# Patient Record
Sex: MALE | Race: WHITE | NOT HISPANIC OR LATINO | Employment: FULL TIME | URBAN - METROPOLITAN AREA
[De-identification: names, ages, dates, MRNs, and addresses within clinical notes are randomized per-mention and may not be internally consistent; named-entity substitution may affect disease eponyms.]

---

## 2018-05-08 ENCOUNTER — TELEPHONE (OUTPATIENT)
Dept: FAMILY MEDICINE | Facility: CLINIC | Age: 43
End: 2018-05-08

## 2018-05-08 DIAGNOSIS — Z00.00 PREVENTATIVE HEALTH CARE: ICD-10-CM

## 2018-05-08 DIAGNOSIS — E78.5 DYSLIPIDEMIA: ICD-10-CM

## 2018-05-08 DIAGNOSIS — E29.1 HYPOGONADISM IN MALE: Primary | ICD-10-CM

## 2018-05-08 PROBLEM — R73.9 HYPERGLYCEMIA: Status: ACTIVE | Noted: 2018-05-08

## 2018-05-08 PROBLEM — Z87.09 HISTORY OF RESPIRATORY SYSTEM DISEASE: Status: ACTIVE | Noted: 2018-05-08

## 2018-05-08 NOTE — TELEPHONE ENCOUNTER
----- Message from Kathie Bryan sent at 5/8/2018  9:07 AM CDT -----  6m fu   Fax lab orders [place] [phone]    appt 5/15/18. Lm    Let me know when these are done so I can notify patient.  lm

## 2018-05-14 VITALS — WEIGHT: 266 LBS | BODY MASS INDEX: 39.4 KG/M2 | HEIGHT: 69 IN

## 2018-05-15 ENCOUNTER — OFFICE VISIT (OUTPATIENT)
Dept: FAMILY MEDICINE | Facility: CLINIC | Age: 43
End: 2018-05-15
Payer: COMMERCIAL

## 2018-05-15 VITALS
DIASTOLIC BLOOD PRESSURE: 72 MMHG | SYSTOLIC BLOOD PRESSURE: 120 MMHG | OXYGEN SATURATION: 97 % | HEIGHT: 69 IN | BODY MASS INDEX: 37.47 KG/M2 | WEIGHT: 253 LBS | HEART RATE: 68 BPM

## 2018-05-15 DIAGNOSIS — T14.8XXA BLOOD BLISTER: ICD-10-CM

## 2018-05-15 DIAGNOSIS — Z00.00 ANNUAL PHYSICAL EXAM: Primary | ICD-10-CM

## 2018-05-15 PROCEDURE — 99396 PREV VISIT EST AGE 40-64: CPT | Mod: ,,, | Performed by: NURSE PRACTITIONER

## 2018-05-15 NOTE — PATIENT INSTRUCTIONS
Weight Management: Exercise and Activity    Studies show that people who exercise are the most likely to lose weight and keep it off. Exercise burns calories. It helps build muscle to make your body stronger. Make exercise an important part of your weight-management plan.  Make activity part of your day  You may not think you have the time to exercise. But you can work activity into your daily life--you just need to be committed. Take 10 minutes out of your lunch hour to take a walk. Walk to the [facility] to get your paper instead of having it delivered. Make it a habit to take the stairs instead of the elevator. Park in a far away parking spot instead of the closest. Youll be surprised at how fast these little changes can make a difference.  Some people really cannot walk very far, and tire out quickly with exercise. Instead of becoming discouraged, resolve to do what you can do, and work to make that a regular frequent habit.   The benefits of exercise  Exercise offers many benefits including:   · Exercise increases your metabolism (the speed at which your body burns calories).  · Regular exercise can increase the amount of muscle in your body. Muscle burns calories faster than fat. The more muscle you have, the more calories you burn.  · Exercise gives you energy and curbs your appetite.  · Exercise decreases stress and helps you sleep better.  Make exercise fun  Exercise can be fun. Choose an activity you enjoy. You may even get a friend to do it with you:  · Take a resistance-training or aerobics class  · Join a team sport  · Take a dance class  · Walk the dog  · Ride a bike  If you have health problems, be sure to ask your healthcare provider before you start an exercise program. Have a  help you develop a plan thats safe for you.   Date Last Reviewed: 2/4/2016  © 3194-1395 [employer]. [address] [place] [address], [place] [address]. All rights reserved. This information is not  intended as a substitute for professional medical care. Always follow your healthcare professional's instructions.        Eating Heart-Healthy Food: Using the DASH Plan    Eating for your heart doesnt have to be hard or boring. You just need to know how to make healthier choices. The DASH eating plan has been developed to help you do just that. DASH stands for Dietary Approaches to Stop Hypertension. It is a plan that has been proven to be healthier for your heart and to lower your risk for high blood pressure. It can also help lower your risk for cancer, heart disease, osteoporosis, and diabetes.  Choosing from each food group  Choose foods from each of the food groups below each day. Try to get the recommended number of servings for each food group. The serving numbers are based on a diet of 2,000 calories a day. Talk to your doctor if youre unsure about your calorie needs. Along with getting the correct servings, the DASH plan also recommends a sodium intake less than 2,300 mg per day.        Grains  Servings: 6 to 8 a day  A serving is:  · 1 slice bread  · 1 ounce dry cereal  · Half a cup cooked rice, pasta or cereal  Best choices: Whole grains and any grains high in fiber. Vegetables  Servings: 4 to 5 a day  A serving is:  · 1 cup raw leafy vegetable  · Half a cup cut-up raw or cooked vegetable  · Half a cup vegetable juice  Best choices: Fresh or frozen vegetables prepared without added salt or fat.   Fruits  Servings: 4 to 5 a day  A serving is:  · 1 medium fruit  · One-quarter cup dried fruit  · Half a cup fresh, frozen, or canned fruit  · Half a cup of 100% fruit juices  Best choices: A variety of fresh fruits of different colors. Whole fruits are a better choice than fruit juices. Low-fat or fat-free dairy  Servings: 2 to 3 a day  A serving is:  · 1 cup milk  · 1 cup yogurt  · One and a half ounces cheese  Best choices: Skim or 1% milk, low-fat or fat-free yogurt or buttermilk, and low-fat cheeses.          Lean meats, poultry, fish  Servings: 6 or fewer a day  A serving is:  · 1 ounce cooked meats, poultry, or fish  · 1 egg  Best choices: Lean poultry and fish. Trim away visible fat. Broil, grill, roast, or boil instead of frying. Remove skin from poultry before eating. Limit how much red meat you eat.  Nuts, seeds, beans  Servings: 4 to 5 a week  A serving is:  · One-third cup nuts (one and a half ounces)  · 2 tablespoons nut butter or seeds  · Half a cup cooked dry beans or legumes  Best choices: Dry roasted nuts with no salt added, lentils, kidney beans, garbanzo beans, and whole vizcaino beans.   Fats and oils  Servings: 2 to 3 a day  A serving is:  · 1 teaspoon vegetable oil  · 1 teaspoon soft margarine  · 1 tablespoon mayonnaise  · 2 tablespoons salad dressing  Best choices: Nut and vegetable oils (nontropical vegetable oils), such as olive and canola oil. Sweets  Servings: 5 a week or fewer  A serving is:  · 1 tablespoon sugar, maple syrup, or honey  · 1 tablespoon jam or jelly  · 1 half-ounce jelly beans (about 15)  · 1 cup lemonade  Best choices: Dried fruit can be a satisfying sweet. Choose low-fat sweets. And watch your serving sizes!      For more on the DASH eating plan, visit:  www.nhlbi.nih.gov/health/health-topics/topics/dash   Date Last Reviewed: 6/1/2016 © 2000-2017 [employer]. [address] [place] [address]. All rights reserved. This information is not intended as a substitute for professional medical care. Always follow your healthcare professional's instructions.

## 2018-05-15 NOTE — PROGRESS NOTES
"    SUBJECTIVE:      Patient ID: Claude A Stevens is a 43 y.o. male.    Chief Complaint: Annual Exam and Numbness (in feet )    Claude is here today for an annual well exam.  He also reports he has a spot on his right big toe that he is concerned about.  He has been trying to lose weight but is not really exercising but is trying to watch his diet.  He has lost 13 lbs since his last visit with us.    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      Foot Injury    The incident occurred more than 1 week ago. The incident occurred at home. Injury mechanism: rubbing. The pain is present in the right toes. The patient is experiencing no pain. Associated symptoms include a loss of sensation and numbness. Pertinent negatives include no inability to bear weight, loss of motion, muscle weakness or tingling. He reports no foreign bodies present. Nothing aggravates the symptoms. He has tried nothing for the symptoms.       Past Surgical History:   Procedure Laterality Date    VASECTOMY       Family History   Problem Relation Age of Onset    Stroke Father       Social History     Social History    Marital status:      Spouse name: N/A    Number of children: N/A    Years of education: N/A     Occupational History    fork  in freezer      Social History Main Topics    Smoking status: Never Smoker    Smokeless tobacco: Never Used    Alcohol use Yes    Drug use: No    Sexual activity: Not Asked     Other Topics Concern    None     Social History Narrative    None     Current Outpatient Prescriptions   Medication Sig Dispense Refill    krill-om-3-dha-epa-phospho-ast (KRILL OIL) 1,575-915-28-50 mg Cap Take by mouth.      vitamin B complex (B COMPLEX 1 ORAL) Take by mouth.       No current facility-administered medications for this visit.      Review of patient's allergies indicates:  No Known Allergies   Past Medical History:   Diagnosis " "Date    Hyperglycemia     Hyperlipidemia     Hypogonadism in male     Sleep apnea      Past Surgical History:   Procedure Laterality Date    VASECTOMY         Review of Systems   Constitutional: Negative for activity change, appetite change, chills, diaphoresis, fatigue, fever and unexpected weight change.   HENT: Negative for congestion, nosebleeds, rhinorrhea, sore throat and voice change.    Eyes: Negative for pain, discharge and visual disturbance.   Respiratory: Negative for apnea, cough, shortness of breath and wheezing.    Cardiovascular: Negative for chest pain and palpitations.   Gastrointestinal: Negative for abdominal pain, constipation, diarrhea, nausea and vomiting.   Endocrine: Negative for polydipsia, polyphagia and polyuria.   Genitourinary: Negative for difficulty urinating, dysuria, frequency, testicular pain and urgency.   Musculoskeletal: Negative for arthralgias, gait problem and myalgias.   Skin: Negative for color change, pallor and rash.   Allergic/Immunologic: Negative for immunocompromised state.   Neurological: Positive for numbness. Negative for dizziness, tingling, syncope, weakness and headaches.   Hematological: Negative for adenopathy.   Psychiatric/Behavioral: Negative for agitation, confusion, dysphoric mood, self-injury and suicidal ideas. The patient is not nervous/anxious.       OBJECTIVE:      Vitals:    05/15/18 1041   BP: 120/72   Pulse: 68   SpO2: 97%   Weight: 114.8 kg (253 lb)   Height: 5' 9" (1.753 m)     Physical Exam   Constitutional: He is oriented to person, place, and time. He appears well-developed and well-nourished. No distress.   HENT:   Head: Normocephalic and atraumatic.   Right Ear: Tympanic membrane, external ear and ear canal normal.   Left Ear: Tympanic membrane, external ear and ear canal normal.   Nose: Nose normal.   Mouth/Throat: Uvula is midline and oropharynx is clear and moist. No oropharyngeal exudate, posterior oropharyngeal edema or posterior " oropharyngeal erythema.   Eyes: Conjunctivae, EOM and lids are normal. Pupils are equal, round, and reactive to light. Right eye exhibits no discharge. Left eye exhibits no discharge. No scleral icterus.   Neck: Normal range of motion. Neck supple. Carotid bruit is not present. No tracheal deviation present. No thyromegaly present.   Cardiovascular: Normal rate, regular rhythm, normal heart sounds and intact distal pulses.  Exam reveals no gallop and no friction rub.    No murmur heard.  Pulses:       Dorsalis pedis pulses are 2+ on the right side, and 2+ on the left side.        Posterior tibial pulses are 2+ on the right side, and 2+ on the left side.   Pulmonary/Chest: Effort normal and breath sounds normal. No respiratory distress. He has no wheezes. He has no rales.   Abdominal: Soft. Bowel sounds are normal. He exhibits no distension and no mass. There is no hepatosplenomegaly. There is no tenderness. There is no rebound and no guarding.   Musculoskeletal: Normal range of motion. He exhibits no edema or tenderness.        Right foot: There is no deformity.        Left foot: There is no deformity.   Feet:   Right Foot:   Protective Sensation: 10 sites tested. 9 sites sensed.   Skin Integrity: Positive for blister, erythema and dry skin. Negative for ulcer, skin breakdown, warmth or callus.   Left Foot:   Protective Sensation: 10 sites tested. 10 sites sensed.   Skin Integrity: Positive for dry skin. Negative for ulcer, blister, skin breakdown, erythema, warmth or callus.   Lymphadenopathy:     He has no cervical adenopathy.   Neurological: He is alert and oriented to person, place, and time. He displays normal reflexes. Coordination normal.   Skin: Skin is warm, dry and intact. Capillary refill takes less than 2 seconds. Rash noted. Rash is macular (bilateral lower extremities-hyperpgmentation). He is not diaphoretic. No erythema. No pallor.   Psychiatric: He has a normal mood and affect. His behavior is  normal. Judgment and thought content normal. He expresses no suicidal plans.   Vitals reviewed.     Assessment:       1. Annual physical exam    2. Blood blister        Plan:       Annual physical exam   Labs reviewed with patient; healthy lifestyle and recommended well screenings and immunizations reviewed with patient as well.  Pt states he believes he had Tdap about 4 years ago, prior to his son's birth.    Blood blister   Warm soaks with Epsom salt; podiatry referral if does not improve    Vascular Dermatitis   Pt education; weight loss      Follow-up if symptoms worsen or fail to improve.      5/15/2018 Haritha Malave, MARITZA, FNP

## 2019-07-16 ENCOUNTER — TELEPHONE (OUTPATIENT)
Dept: FAMILY MEDICINE | Facility: CLINIC | Age: 44
End: 2019-07-16

## 2019-07-16 DIAGNOSIS — E29.1 HYPOGONADISM IN MALE: ICD-10-CM

## 2019-07-16 DIAGNOSIS — R73.9 HYPERGLYCEMIA: ICD-10-CM

## 2019-07-16 DIAGNOSIS — E78.5 DYSLIPIDEMIA: Primary | ICD-10-CM

## 2019-07-16 DIAGNOSIS — Z00.00 ANNUAL PHYSICAL EXAM: ICD-10-CM

## 2019-07-16 NOTE — TELEPHONE ENCOUNTER
----- Message from Berta Cook sent at 7/16/2019  8:12 AM CDT -----  toro for 7/24, please send lab order to [facility]

## 2019-07-24 ENCOUNTER — OFFICE VISIT (OUTPATIENT)
Dept: FAMILY MEDICINE | Facility: CLINIC | Age: 44
End: 2019-07-24
Payer: COMMERCIAL

## 2019-07-24 ENCOUNTER — TELEPHONE (OUTPATIENT)
Dept: FAMILY MEDICINE | Facility: CLINIC | Age: 44
End: 2019-07-24

## 2019-07-24 VITALS
TEMPERATURE: 99 F | SYSTOLIC BLOOD PRESSURE: 120 MMHG | HEIGHT: 69 IN | DIASTOLIC BLOOD PRESSURE: 78 MMHG | OXYGEN SATURATION: 97 % | WEIGHT: 249 LBS | BODY MASS INDEX: 36.88 KG/M2 | HEART RATE: 70 BPM

## 2019-07-24 DIAGNOSIS — Z23 NEED FOR TDAP VACCINATION: ICD-10-CM

## 2019-07-24 DIAGNOSIS — Z00.00 ANNUAL PHYSICAL EXAM: Primary | ICD-10-CM

## 2019-07-24 PROCEDURE — 99396 PREV VISIT EST AGE 40-64: CPT | Mod: 25,,, | Performed by: NURSE PRACTITIONER

## 2019-07-24 PROCEDURE — 90715 TDAP VACCINE GREATER THAN OR EQUAL TO 7YO IM: ICD-10-PCS | Mod: ,,, | Performed by: NURSE PRACTITIONER

## 2019-07-24 PROCEDURE — 90471 IMMUNIZATION ADMIN: CPT | Mod: ,,, | Performed by: NURSE PRACTITIONER

## 2019-07-24 PROCEDURE — 90471 TDAP VACCINE GREATER THAN OR EQUAL TO 7YO IM: ICD-10-PCS | Mod: ,,, | Performed by: NURSE PRACTITIONER

## 2019-07-24 PROCEDURE — 99396 PR PREVENTIVE VISIT,EST,40-64: ICD-10-PCS | Mod: 25,,, | Performed by: NURSE PRACTITIONER

## 2019-07-24 PROCEDURE — 90715 TDAP VACCINE 7 YRS/> IM: CPT | Mod: ,,, | Performed by: NURSE PRACTITIONER

## 2019-07-24 NOTE — PROGRESS NOTES
SUBJECTIVE:      Patient ID: Claude A Stevens is a 44 y.o. male.    Chief Complaint: Annual Exam    Claude is here today for an annual wellness exam.  He states he has been doing well since his last visit.  He denies new complaints.  Since he was here last, he has started following a healthier diet and he is walking 4 miles a day.  His goal is to start running 5Ks.  Labs reviewed with patient and minor abnormalities discussed.      Past Surgical History:   Procedure Laterality Date    VASECTOMY       Family History   Problem Relation Age of Onset    Stroke Father       Social History     Socioeconomic History    Marital status:      Spouse name: Not on file    Number of children: Not on file    Years of education: Not on file    Highest education level: Not on file   Occupational History    Occupation: fork  in freezer   Social Needs    Financial resource strain: Not on file    Food insecurity:     Worry: Not on file     Inability: Not on file    Transportation needs:     Medical: Not on file     Non-medical: Not on file   Tobacco Use    Smoking status: Never Smoker    Smokeless tobacco: Never Used   Substance and Sexual Activity    Alcohol use: Yes    Drug use: No    Sexual activity: Not on file   Lifestyle    Physical activity:     Days per week: Not on file     Minutes per session: Not on file    Stress: Not on file   Relationships    Social connections:     Talks on phone: Not on file     Gets together: Not on file     Attends Buddhism service: Not on file     Active member of club or organization: Not on file     Attends meetings of clubs or organizations: Not on file     Relationship status: Not on file   Other Topics Concern    Not on file   Social History Narrative    Not on file     No current outpatient medications on file.     No current facility-administered medications for this visit.      Review of patient's allergies indicates:  No Known Allergies   Past  "Medical History:   Diagnosis Date    Hyperglycemia     Hyperlipidemia     Hypogonadism in male     Sleep apnea      Past Surgical History:   Procedure Laterality Date    VASECTOMY         Review of Systems   Constitutional: Negative for activity change, appetite change, chills, diaphoresis, fatigue, fever and unexpected weight change.   HENT: Negative for congestion, nosebleeds, postnasal drip, rhinorrhea, sore throat and voice change.    Eyes: Negative for pain, discharge and visual disturbance.   Respiratory: Negative for apnea, cough, shortness of breath and wheezing.    Cardiovascular: Negative for chest pain, palpitations and leg swelling.   Gastrointestinal: Negative for abdominal pain, constipation, diarrhea, nausea and vomiting.   Endocrine: Negative for polydipsia, polyphagia and polyuria.   Genitourinary: Negative for difficulty urinating, dysuria, frequency, testicular pain and urgency.   Musculoskeletal: Negative for arthralgias, gait problem, myalgias and neck pain.   Skin: Negative for color change, pallor and rash.   Allergic/Immunologic: Negative for immunocompromised state.   Neurological: Negative for dizziness, syncope, weakness, numbness and headaches.   Hematological: Negative for adenopathy. Does not bruise/bleed easily.   Psychiatric/Behavioral: Negative for confusion, dysphoric mood, self-injury, sleep disturbance and suicidal ideas. The patient is not nervous/anxious.       OBJECTIVE:      Vitals:    07/24/19 1535   BP: 120/78   Pulse: 70   Temp: 98.8 °F (37.1 °C)   SpO2: 97%   Weight: 112.9 kg (249 lb)   Height: 5' 9" (1.753 m)     Physical Exam   Constitutional: He is oriented to person, place, and time. He appears well-developed and well-nourished. No distress.   HENT:   Head: Normocephalic and atraumatic.   Right Ear: Tympanic membrane, external ear and ear canal normal.   Left Ear: Tympanic membrane, external ear and ear canal normal.   Nose: Nose normal.   Mouth/Throat: Uvula is " midline and oropharynx is clear and moist. No oropharyngeal exudate, posterior oropharyngeal edema or posterior oropharyngeal erythema.   Eyes: Pupils are equal, round, and reactive to light. Conjunctivae, EOM and lids are normal. Right eye exhibits no discharge. Left eye exhibits no discharge. No scleral icterus.   Neck: Normal range of motion. Neck supple. Carotid bruit is not present. No tracheal deviation present. No thyromegaly present.   Cardiovascular: Normal rate, regular rhythm, normal heart sounds and intact distal pulses. Exam reveals no gallop and no friction rub.   No murmur heard.  Pulmonary/Chest: Effort normal and breath sounds normal. No stridor. No respiratory distress. He has no wheezes. He has no rales.   Abdominal: Soft. Bowel sounds are normal. He exhibits no distension and no mass. There is no hepatosplenomegaly. There is no tenderness. There is no rigidity, no rebound, no guarding and no CVA tenderness.   Musculoskeletal: Normal range of motion. He exhibits no edema.   Lymphadenopathy:     He has no cervical adenopathy.   Neurological: He is alert and oriented to person, place, and time. He displays normal reflexes.   Skin: Skin is warm, dry and intact. Capillary refill takes less than 2 seconds. He is not diaphoretic. No erythema. No pallor.   Psychiatric: He has a normal mood and affect. His behavior is normal. Judgment and thought content normal. He expresses no suicidal plans.      Assessment:       1. Annual physical exam    2. Need for Tdap vaccination        Plan:       Annual physical exam   Healthy lifestyle and recommended well screenings including immunizations reviewed with patient.    Need for Tdap vaccination  -     (In Office Administered) Tdap Vaccine        Follow up in about 1 year (around 7/24/2020), or if symptoms worsen or fail to improve, for annual wellness.      7/24/2019 Haritha Malave, MARITZA, FNP

## 2019-07-24 NOTE — PATIENT INSTRUCTIONS
Prevention Guidelines, Men Ages 40 to 49  Screening tests and vaccines are an important part of managing your health. Health counseling is essential, too. Below are guidelines for these, for men ages 40 to 49. Talk with your healthcare provider to make sure youre up to date on what you need.  Screening Who needs it How often   Alcohol misuse All men in this age group At routine exams   Blood pressure All men in this age group Every 2 years if your blood pressure reading is less than 120/80 mm Hg; yearly if your systolic blood pressure reading is 120 to 139 mm Hg, or your diastolic blood pressure reading is 80 to 89 mm Hg   Depression All men in this age group At routine exams   Type 2 diabetes or prediabetes All adults beginning at age 45 and adults without symptoms at any age who are overweight or obese and have 1 or more other risk factors for diabetes At least every 3 years (yearly if blood sugar has begun to rise)   Hepatitis C Men at increased risk for infection - talk with your healthcare provider At routine exams   High cholesterol or triglycerides All men ages 35 and older, and younger men at high risk for coronary artery disease At least every 5 years   HIV All men At routine exams   Obesity All men in this age group At routine exams   Prostate cancer Starting at age 45, talk to healthcare provider about risks and benefits of digital rectal exam (RYLEE) and prostate-specific antigen (PSA) screening1 At routine exams   Syphilis Men at increased risk for infection - talk with your healthcare provider At routine exams   Tuberculosis Men at increased risk for infection - talk with your healthcare provider Check with your healthcare provider   Vision All men in this age group Every 2 to 4 years if no risk factors for eye disease2   Vaccine Who needs it How often   Chickenpox (varicella) All men in this age group who have no record of this infection or vaccine 2 doses; the second dose should be given at least 4  weeks after the first dose   Hepatitis A Men at increased risk for infection - talk with your healthcare provider 2 doses given at least 6 months apart   Hepatitis B Men at increased risk for infection - talk with your healthcare provider 3 doses over 6 months; second dose should be given 1 month after the first dose; the third dose should be given at least 2 months after the second dose and at least 4 months after the first dose   Haemophilus influenzae Type B (HIB) Men at increased risk for infection - talk with your healthcare provider 1 to 3 doses   Influenza (flu) All men in this age group Once a year   Measles, mumps, rubella (MMR) All men in this age group who have no record of these infections or vaccines 1 or 2 doses   Meningococcal Men at increased risk for infection - talk with your healthcare provider 1 or more doses   Pneumococcal conjugate vaccine (PCV13) and pneumococcal polysaccharide vaccine (PPSV23) Men at increased risk for infection - talk with your healthcare provider PCV13: 1 dose ages 19 to 65 (protects against 13 types of pneumococcal bacteria)     PPSV23: 1 to 2 doses through age 64, or 1 dose at 65 or older (protects against 23 types of pneumococcal bacteria)      Tetanus/diphtheria/  pertussis (Td/Tdap) booster All men in this age group Td every 10 years, or a one-time dose of Tdap instead of a Td booster after age 18, then Td every 10 years   Counseling Who needs it How often   Diet and exercise Men who are overweight or obese When diagnosed, and then at routine exams   Sexually transmitted infection prevention Men at increased risk for infection - talk with your healthcare provider At routine exams   Use of daily aspirin Men ages 45 to 79 at risk for cardiovascular health problems At routine exams   Use of tobacco and the health effects it can cause All men in this age group Every exam   [address] [place] [address] [facility]   [facility] [employer]  Date Last Reviewed:  2/1/2017  © 2345-4750 [employer]. [address] [place] [address]. All rights reserved. This information is not intended as a substitute for professional medical care. Always follow your healthcare professional's instructions.        Aerobic Exercise for a Healthy Heart  Exercise is a lot more than an energy booster and a stress reliever. It also strengthens your heart muscle, lowers your blood pressure and cholesterol, and burns calories. It can also improve your resting muscle tone, and your mood.     Remember, some activity is better than none.    Choose an aerobic activity  Choose an activity that makes your heart and lungs work harder than they do when you rest or walk normally. This aerobic exercise can improve the way your heart and other muscles use oxygen. Make it fun by exercising with a friend and choosing an activity you enjoy. Here are some ideas:  · Walking  · Swimming  · Bicycling  · Stair climbing  · Dancing  · Jogging  · Gardening  Exercise regularly  If you havent been exercising regularly,  get your doctors OK first. Then start slowly.  Here are some tips:  · Begin exercising 3 times a week for 5 to 10 minutes at a time.  · When you feel comfortable, add a few minutes each session.  · Slowly build up to exercising 3 to 4 times each week. Each session should last for 40 minutes, on average, and involve moderate- to vigorous-intensity physical activity.  · If you have been given nitroglycerin, be sure to carry it when you exercise.  · If you get chest pain (angina) when youre exercising, stop what youre doing, take your nitroglycerin, and call your doctor.  Date Last Reviewed: 6/2/2016  © 4442-1770 [employer]. [address] [place] [address]. All rights reserved. This information is not intended as a substitute for professional medical care. Always follow your healthcare professional's instructions.        Eating Heart-Healthy Food: Using the  DASH Plan    Eating for your heart doesnt have to be hard or boring. You just need to know how to make healthier choices. The DASH eating plan has been developed to help you do just that. DASH stands for Dietary Approaches to Stop Hypertension. It is a plan that has been proven to be healthier for your heart and to lower your risk for high blood pressure. It can also help lower your risk for cancer, heart disease, osteoporosis, and diabetes.  Choosing from each food group  Choose foods from each of the food groups below each day. Try to get the recommended number of servings for each food group. The serving numbers are based on a diet of 2,000 calories a day. Talk to your doctor if youre unsure about your calorie needs. Along with getting the correct servings, the DASH plan also recommends a sodium intake less than 2,300 mg per day.        Grains  Servings: 6 to 8 a day  A serving is:  · 1 slice bread  · 1 ounce dry cereal  · Half a cup cooked rice, pasta or cereal  Best choices: Whole grains and any grains high in fiber. Vegetables  Servings: 4 to 5 a day  A serving is:  · 1 cup raw leafy vegetable  · Half a cup cut-up raw or cooked vegetable  · Half a cup vegetable juice  Best choices: Fresh or frozen vegetables prepared without added salt or fat.   Fruits  Servings: 4 to 5 a day  A serving is:  · 1 medium fruit  · One-quarter cup dried fruit  · Half a cup fresh, frozen, or canned fruit  · Half a cup of 100% fruit juices  Best choices: A variety of fresh fruits of different colors. Whole fruits are a better choice than fruit juices. Low-fat or fat-free dairy  Servings: 2 to 3 a day  A serving is:  · 1 cup milk  · 1 cup yogurt  · One and a half ounces cheese  Best choices: Skim or 1% milk, low-fat or fat-free yogurt or buttermilk, and low-fat cheeses.         Lean meats, poultry, fish  Servings: 6 or fewer a day  A serving is:  · 1 ounce cooked meats, poultry, or fish  · 1 egg  Best choices: Lean poultry and  fish. Trim away visible fat. Broil, grill, roast, or boil instead of frying. Remove skin from poultry before eating. Limit how much red meat you eat.  Nuts, seeds, beans  Servings: 4 to 5 a week  A serving is:  · One-third cup nuts (one and a half ounces)  · 2 tablespoons nut butter or seeds  · Half a cup cooked dry beans or legumes  Best choices: Dry roasted nuts with no salt added, lentils, kidney beans, garbanzo beans, and whole vizcaino beans.   Fats and oils  Servings: 2 to 3 a day  A serving is:  · 1 teaspoon vegetable oil  · 1 teaspoon soft margarine  · 1 tablespoon mayonnaise  · 2 tablespoons salad dressing  Best choices: Nut and vegetable oils (nontropical vegetable oils), such as olive and canola oil. Sweets  Servings: 5 a week or fewer  A serving is:  · 1 tablespoon sugar, maple syrup, or honey  · 1 tablespoon jam or jelly  · 1 half-ounce jelly beans (about 15)  · 1 cup lemonade  Best choices: Dried fruit can be a satisfying sweet. Choose low-fat sweets. And watch your serving sizes!      For more on the DASH eating plan, visit:  www.nhlbi.nih.gov/health/health-topics/topics/dash   Date Last Reviewed: 6/1/2016  © 9834-1153 [employer]. [address] [place] [address], [place] [address]. All rights reserved. This information is not intended as a substitute for professional medical care. Always follow your healthcare professional's instructions.

## 2019-07-24 NOTE — TELEPHONE ENCOUNTER
One liver enzyme is slightly elevated; otherwise, labs all look good.  He is due for routine office visit.

## 2020-07-29 ENCOUNTER — TELEPHONE (OUTPATIENT)
Dept: FAMILY MEDICINE | Facility: CLINIC | Age: 45
End: 2020-07-29

## 2020-07-29 DIAGNOSIS — Z00.00 ANNUAL PHYSICAL EXAM: Primary | ICD-10-CM

## 2021-05-07 ENCOUNTER — TELEPHONE (OUTPATIENT)
Dept: FAMILY MEDICINE | Facility: CLINIC | Age: 46
End: 2021-05-07

## 2021-05-13 ENCOUNTER — OFFICE VISIT (OUTPATIENT)
Dept: FAMILY MEDICINE | Facility: CLINIC | Age: 46
End: 2021-05-13
Payer: COMMERCIAL

## 2021-05-13 VITALS
HEIGHT: 69 IN | WEIGHT: 277 LBS | SYSTOLIC BLOOD PRESSURE: 130 MMHG | HEART RATE: 75 BPM | BODY MASS INDEX: 41.03 KG/M2 | DIASTOLIC BLOOD PRESSURE: 78 MMHG | TEMPERATURE: 98 F | OXYGEN SATURATION: 97 %

## 2021-05-13 DIAGNOSIS — E66.01 MORBID OBESITY: ICD-10-CM

## 2021-05-13 DIAGNOSIS — Z00.00 PHYSICAL EXAM: Primary | ICD-10-CM

## 2021-05-13 DIAGNOSIS — G47.33 OSA ON CPAP: ICD-10-CM

## 2021-05-13 DIAGNOSIS — R53.83 FATIGUE, UNSPECIFIED TYPE: ICD-10-CM

## 2021-05-13 PROCEDURE — 3008F PR BODY MASS INDEX (BMI) DOCUMENTED: ICD-10-PCS | Mod: CPTII,S$GLB,, | Performed by: FAMILY MEDICINE

## 2021-05-13 PROCEDURE — 3008F BODY MASS INDEX DOCD: CPT | Mod: CPTII,S$GLB,, | Performed by: FAMILY MEDICINE

## 2021-05-13 PROCEDURE — 99386 PR PREVENTIVE VISIT,NEW,40-64: ICD-10-PCS | Mod: S$GLB,,, | Performed by: FAMILY MEDICINE

## 2021-05-13 PROCEDURE — 1126F PR PAIN SEVERITY QUANTIFIED, NO PAIN PRESENT: ICD-10-PCS | Mod: S$GLB,,, | Performed by: FAMILY MEDICINE

## 2021-05-13 PROCEDURE — 99386 PREV VISIT NEW AGE 40-64: CPT | Mod: S$GLB,,, | Performed by: FAMILY MEDICINE

## 2021-05-13 PROCEDURE — 1126F AMNT PAIN NOTED NONE PRSNT: CPT | Mod: S$GLB,,, | Performed by: FAMILY MEDICINE

## 2021-06-09 LAB — HBA1C MFR BLD: 5.8 % (ref 4–6)

## 2021-06-14 ENCOUNTER — OFFICE VISIT (OUTPATIENT)
Dept: FAMILY MEDICINE | Facility: CLINIC | Age: 46
End: 2021-06-14
Payer: COMMERCIAL

## 2021-06-14 VITALS
DIASTOLIC BLOOD PRESSURE: 74 MMHG | TEMPERATURE: 98 F | WEIGHT: 278 LBS | SYSTOLIC BLOOD PRESSURE: 128 MMHG | HEART RATE: 60 BPM | HEIGHT: 69 IN | OXYGEN SATURATION: 96 % | BODY MASS INDEX: 41.18 KG/M2

## 2021-06-14 DIAGNOSIS — E29.1 HYPOGONADISM IN MALE: Primary | ICD-10-CM

## 2021-06-14 DIAGNOSIS — G47.33 OSA ON CPAP: ICD-10-CM

## 2021-06-14 DIAGNOSIS — E66.01 MORBID OBESITY: ICD-10-CM

## 2021-06-14 PROCEDURE — 3008F PR BODY MASS INDEX (BMI) DOCUMENTED: ICD-10-PCS | Mod: CPTII,S$GLB,, | Performed by: FAMILY MEDICINE

## 2021-06-14 PROCEDURE — 1126F PR PAIN SEVERITY QUANTIFIED, NO PAIN PRESENT: ICD-10-PCS | Mod: S$GLB,,, | Performed by: FAMILY MEDICINE

## 2021-06-14 PROCEDURE — 99213 OFFICE O/P EST LOW 20 MIN: CPT | Mod: S$GLB,,, | Performed by: FAMILY MEDICINE

## 2021-06-14 PROCEDURE — 99213 PR OFFICE/OUTPT VISIT, EST, LEVL III, 20-29 MIN: ICD-10-PCS | Mod: S$GLB,,, | Performed by: FAMILY MEDICINE

## 2021-06-14 PROCEDURE — 3008F BODY MASS INDEX DOCD: CPT | Mod: CPTII,S$GLB,, | Performed by: FAMILY MEDICINE

## 2021-06-14 PROCEDURE — 1126F AMNT PAIN NOTED NONE PRSNT: CPT | Mod: S$GLB,,, | Performed by: FAMILY MEDICINE

## 2021-06-14 RX ORDER — PHENTERMINE HYDROCHLORIDE 37.5 MG/1
37.5 TABLET ORAL
Qty: 30 TABLET | Refills: 0 | Status: SHIPPED | OUTPATIENT
Start: 2021-06-14 | End: 2021-07-14

## 2021-06-16 PROBLEM — E66.01 MORBID OBESITY: Status: ACTIVE | Noted: 2021-06-16

## 2021-06-16 PROBLEM — G47.33 OSA ON CPAP: Status: ACTIVE | Noted: 2021-06-16

## 2021-07-02 ENCOUNTER — PATIENT OUTREACH (OUTPATIENT)
Dept: ADMINISTRATIVE | Facility: HOSPITAL | Age: 46
End: 2021-07-02

## 2021-07-27 ENCOUNTER — OFFICE VISIT (OUTPATIENT)
Dept: FAMILY MEDICINE | Facility: CLINIC | Age: 46
End: 2021-07-27
Payer: COMMERCIAL

## 2021-07-27 VITALS
OXYGEN SATURATION: 97 % | BODY MASS INDEX: 38.06 KG/M2 | WEIGHT: 257 LBS | HEIGHT: 69 IN | TEMPERATURE: 97 F | DIASTOLIC BLOOD PRESSURE: 80 MMHG | HEART RATE: 75 BPM | SYSTOLIC BLOOD PRESSURE: 128 MMHG

## 2021-07-27 DIAGNOSIS — R73.03 PREDIABETES: ICD-10-CM

## 2021-07-27 DIAGNOSIS — E29.1 HYPOGONADISM IN MALE: ICD-10-CM

## 2021-07-27 DIAGNOSIS — Z12.5 SCREENING PSA (PROSTATE SPECIFIC ANTIGEN): ICD-10-CM

## 2021-07-27 DIAGNOSIS — G47.33 OSA ON CPAP: Primary | ICD-10-CM

## 2021-07-27 DIAGNOSIS — E66.01 MORBID OBESITY: ICD-10-CM

## 2021-07-27 PROCEDURE — 3074F PR MOST RECENT SYSTOLIC BLOOD PRESSURE < 130 MM HG: ICD-10-PCS | Mod: CPTII,S$GLB,, | Performed by: FAMILY MEDICINE

## 2021-07-27 PROCEDURE — 3008F BODY MASS INDEX DOCD: CPT | Mod: CPTII,S$GLB,, | Performed by: FAMILY MEDICINE

## 2021-07-27 PROCEDURE — 1159F MED LIST DOCD IN RCRD: CPT | Mod: CPTII,S$GLB,, | Performed by: FAMILY MEDICINE

## 2021-07-27 PROCEDURE — 3008F PR BODY MASS INDEX (BMI) DOCUMENTED: ICD-10-PCS | Mod: CPTII,S$GLB,, | Performed by: FAMILY MEDICINE

## 2021-07-27 PROCEDURE — 3079F PR MOST RECENT DIASTOLIC BLOOD PRESSURE 80-89 MM HG: ICD-10-PCS | Mod: CPTII,S$GLB,, | Performed by: FAMILY MEDICINE

## 2021-07-27 PROCEDURE — 3044F HG A1C LEVEL LT 7.0%: CPT | Mod: CPTII,S$GLB,, | Performed by: FAMILY MEDICINE

## 2021-07-27 PROCEDURE — 1160F RVW MEDS BY RX/DR IN RCRD: CPT | Mod: CPTII,S$GLB,, | Performed by: FAMILY MEDICINE

## 2021-07-27 PROCEDURE — 1159F PR MEDICATION LIST DOCUMENTED IN MEDICAL RECORD: ICD-10-PCS | Mod: CPTII,S$GLB,, | Performed by: FAMILY MEDICINE

## 2021-07-27 PROCEDURE — 1126F PR PAIN SEVERITY QUANTIFIED, NO PAIN PRESENT: ICD-10-PCS | Mod: CPTII,S$GLB,, | Performed by: FAMILY MEDICINE

## 2021-07-27 PROCEDURE — 3074F SYST BP LT 130 MM HG: CPT | Mod: CPTII,S$GLB,, | Performed by: FAMILY MEDICINE

## 2021-07-27 PROCEDURE — 3044F PR MOST RECENT HEMOGLOBIN A1C LEVEL <7.0%: ICD-10-PCS | Mod: CPTII,S$GLB,, | Performed by: FAMILY MEDICINE

## 2021-07-27 PROCEDURE — 99213 OFFICE O/P EST LOW 20 MIN: CPT | Mod: S$GLB,,, | Performed by: FAMILY MEDICINE

## 2021-07-27 PROCEDURE — 99213 PR OFFICE/OUTPT VISIT, EST, LEVL III, 20-29 MIN: ICD-10-PCS | Mod: S$GLB,,, | Performed by: FAMILY MEDICINE

## 2021-07-27 PROCEDURE — 1160F PR REVIEW ALL MEDS BY PRESCRIBER/CLIN PHARMACIST DOCUMENTED: ICD-10-PCS | Mod: CPTII,S$GLB,, | Performed by: FAMILY MEDICINE

## 2021-07-27 PROCEDURE — 3079F DIAST BP 80-89 MM HG: CPT | Mod: CPTII,S$GLB,, | Performed by: FAMILY MEDICINE

## 2021-07-27 PROCEDURE — 1126F AMNT PAIN NOTED NONE PRSNT: CPT | Mod: CPTII,S$GLB,, | Performed by: FAMILY MEDICINE

## 2021-07-27 RX ORDER — CIPROFLOXACIN 500 MG/1
500 TABLET ORAL EVERY 12 HOURS
Qty: 30 TABLET | Refills: 0 | Status: SHIPPED | OUTPATIENT
Start: 2021-07-27 | End: 2022-07-25

## 2021-07-27 RX ORDER — PHENTERMINE HYDROCHLORIDE 37.5 MG/1
37.5 TABLET ORAL
Qty: 30 TABLET | Refills: 0 | Status: SHIPPED | OUTPATIENT
Start: 2021-07-27 | End: 2021-08-26

## 2021-08-01 PROBLEM — R73.03 PREDIABETES: Status: ACTIVE | Noted: 2021-08-01

## 2021-08-01 PROBLEM — R73.9 HYPERGLYCEMIA: Status: RESOLVED | Noted: 2018-05-08 | Resolved: 2021-08-01

## 2022-03-18 ENCOUNTER — PATIENT MESSAGE (OUTPATIENT)
Dept: ADMINISTRATIVE | Facility: HOSPITAL | Age: 47
End: 2022-03-18
Payer: COMMERCIAL

## 2022-06-24 DIAGNOSIS — Z12.11 COLON CANCER SCREENING: ICD-10-CM

## 2022-07-25 ENCOUNTER — OFFICE VISIT (OUTPATIENT)
Dept: URGENT CARE | Facility: CLINIC | Age: 47
End: 2022-07-25
Payer: COMMERCIAL

## 2022-07-25 VITALS
HEART RATE: 79 BPM | DIASTOLIC BLOOD PRESSURE: 90 MMHG | HEIGHT: 69 IN | OXYGEN SATURATION: 98 % | SYSTOLIC BLOOD PRESSURE: 136 MMHG | WEIGHT: 280 LBS | BODY MASS INDEX: 41.47 KG/M2 | TEMPERATURE: 99 F | RESPIRATION RATE: 18 BRPM

## 2022-07-25 DIAGNOSIS — H44.001 EYE INFECTION, RIGHT: Primary | ICD-10-CM

## 2022-07-25 PROCEDURE — 3075F SYST BP GE 130 - 139MM HG: CPT | Mod: CPTII,S$GLB,, | Performed by: NURSE PRACTITIONER

## 2022-07-25 PROCEDURE — 1159F PR MEDICATION LIST DOCUMENTED IN MEDICAL RECORD: ICD-10-PCS | Mod: CPTII,S$GLB,, | Performed by: NURSE PRACTITIONER

## 2022-07-25 PROCEDURE — 1160F RVW MEDS BY RX/DR IN RCRD: CPT | Mod: CPTII,S$GLB,, | Performed by: NURSE PRACTITIONER

## 2022-07-25 PROCEDURE — 3008F BODY MASS INDEX DOCD: CPT | Mod: CPTII,S$GLB,, | Performed by: NURSE PRACTITIONER

## 2022-07-25 PROCEDURE — 3080F DIAST BP >= 90 MM HG: CPT | Mod: CPTII,S$GLB,, | Performed by: NURSE PRACTITIONER

## 2022-07-25 PROCEDURE — 99203 OFFICE O/P NEW LOW 30 MIN: CPT | Mod: S$GLB,,, | Performed by: NURSE PRACTITIONER

## 2022-07-25 PROCEDURE — 1159F MED LIST DOCD IN RCRD: CPT | Mod: CPTII,S$GLB,, | Performed by: NURSE PRACTITIONER

## 2022-07-25 PROCEDURE — 3080F PR MOST RECENT DIASTOLIC BLOOD PRESSURE >= 90 MM HG: ICD-10-PCS | Mod: CPTII,S$GLB,, | Performed by: NURSE PRACTITIONER

## 2022-07-25 PROCEDURE — 99203 PR OFFICE/OUTPT VISIT, NEW, LEVL III, 30-44 MIN: ICD-10-PCS | Mod: S$GLB,,, | Performed by: NURSE PRACTITIONER

## 2022-07-25 PROCEDURE — 3075F PR MOST RECENT SYSTOLIC BLOOD PRESS GE 130-139MM HG: ICD-10-PCS | Mod: CPTII,S$GLB,, | Performed by: NURSE PRACTITIONER

## 2022-07-25 PROCEDURE — 3008F PR BODY MASS INDEX (BMI) DOCUMENTED: ICD-10-PCS | Mod: CPTII,S$GLB,, | Performed by: NURSE PRACTITIONER

## 2022-07-25 PROCEDURE — 1160F PR REVIEW ALL MEDS BY PRESCRIBER/CLIN PHARMACIST DOCUMENTED: ICD-10-PCS | Mod: CPTII,S$GLB,, | Performed by: NURSE PRACTITIONER

## 2022-07-25 NOTE — PROGRESS NOTES
"Subjective:       Patient ID: Claude A Stevens is a 47 y.o. male.    Vitals:  height is 5' 9" (1.753 m) and weight is 127 kg (280 lb). His temperature is 98.9 °F (37.2 °C). His blood pressure is 136/90 (abnormal) and his pulse is 79. His respiration is 18 and oxygen saturation is 98%.     Chief Complaint: Eye Injury and Dental Pain    Patient attributes to wearing contacts he has had similar    Eye Injury   The right eye is affected. This is a new problem. The current episode started in the past 7 days (x's 2 days ). The problem occurs constantly. The problem has been unchanged. The injury mechanism was contact lenses. The pain is at a severity of 4/10. The pain is mild. There is no known exposure to pink eye. He wears contacts. Associated symptoms include blurred vision, an eye discharge, eye redness and photophobia. Pertinent negatives include no fever (feeling feverish). He has tried nothing (erythromicin ) for the symptoms. The treatment provided no relief.       Constitution: Positive for chills. Negative for fever (feeling feverish).   HENT: Positive for facial swelling (Right-sided facial pain.  Patient uncertain if due to current I symptoms or if it is a tooth ache). Negative for facial trauma.    Eyes: Positive for eye discharge, eye pain, eye redness, photophobia and blurred vision. Negative for eye trauma and foreign body in eye.       Objective:      Physical Exam   Constitutional: He is oriented to person, place, and time. He appears well-developed.  Non-toxic appearance. He appears ill. No distress.   HENT:   Head: Normocephalic and atraumatic.   Nose: Nose normal. No rhinorrhea or congestion.   Mouth/Throat: Oropharynx is clear and moist.   Eyes: Pupils are equal, round, and reactive to light. Right eye exhibits chemosis and discharge. Right eye exhibits no exudate and no hordeolum. Right conjunctiva is injected. Right conjunctiva has a hemorrhage. Right eye exhibits abnormal extraocular motion " (Painful). Right pupil is sluggish. Right pupil is round. vision grossly intact      Comments: Significant photophobia.     Neck: Neck supple. No neck rigidity present.   Cardiovascular: Normal rate, regular rhythm and normal heart sounds.   Pulmonary/Chest: Effort normal. No respiratory distress.   Abdominal: Normal appearance.   Musculoskeletal:      Cervical back: He exhibits no tenderness.   Lymphadenopathy:     He has no cervical adenopathy.   Neurological: no focal deficit. He is alert and oriented to person, place, and time.   Skin: Skin is warm, dry and not diaphoretic. Capillary refill takes 2 to 3 seconds.   Psychiatric: His behavior is normal. Mood normal.   Nursing note and vitals reviewed.        Assessment:       1. Eye infection, right          Plan:         Eye infection, right    To ER for further evaluation of infection right eye be on simple conjunctivitis with globe pain, pain with extraocular movement, severe photophobia, chemosis and hemorrhage     Concern for orbital cellulitis.  Patient informed of the emergent status of requiring immediate treatment and if not treated appropriately immediately unknown long-term affect which could include loss of vision or eye or systemic infection.  Verbalizes understanding states that he is going to go to [facility]

## 2022-07-28 DIAGNOSIS — Z12.11 COLON CANCER SCREENING: ICD-10-CM

## 2022-08-29 ENCOUNTER — TELEPHONE (OUTPATIENT)
Dept: FAMILY MEDICINE | Facility: CLINIC | Age: 47
End: 2022-08-29
Payer: COMMERCIAL

## 2022-08-29 NOTE — TELEPHONE ENCOUNTER
----- Message from Higinio Thompson MA sent at 8/29/2022  9:18 AM CDT -----  Contact: STEVENS, CLAUDE A [[id number]]  Type: Needs Medical Advice    Who Called: STEVENS, CLAUDE A [[id number]]  Best Call Back Number: [phone]  Inquiry/Question: Please call STEVENS, CLAUDE A [[id number]] regarding a covid test      Thank you~

## 2022-08-29 NOTE — TELEPHONE ENCOUNTER
Pt wants a covid test script for his pharm so he can use it on his wife and kids. He has already taken a test and tested positive (home test) jasper RAHMAN advised instructions for covid positive. For the test he wants a script for to use on his family dr lunsford stated he cant because he has never assessed or established his family as his patient so it would be his responsibility if something were to happen to the family if he was to order the test. Patient was not happy with that answer and stated its not rocket science and its not like he is asking for medication and hung the phone up in my face. Also before he hung up I advised him he hasnt been seen in a year

## 2023-01-10 ENCOUNTER — TELEPHONE (OUTPATIENT)
Dept: FAMILY MEDICINE | Facility: CLINIC | Age: 48
End: 2023-01-10
Payer: COMMERCIAL

## 2023-01-10 DIAGNOSIS — R53.83 FATIGUE, UNSPECIFIED TYPE: ICD-10-CM

## 2023-01-10 DIAGNOSIS — E78.5 DYSLIPIDEMIA: ICD-10-CM

## 2023-01-10 DIAGNOSIS — R73.03 PREDIABETES: Primary | ICD-10-CM

## 2023-01-10 DIAGNOSIS — Z00.00 ANNUAL PHYSICAL EXAM: ICD-10-CM

## 2023-01-10 NOTE — TELEPHONE ENCOUNTER
----- Message from Curtis Sheppard sent at 1/10/2023 12:28 PM CST -----  Contact: pt  Type: Needs Medical Advice  Who Called:  pt     Best Call Back Number: [phone]    Additional Information: pt would like orders for blood test put in and sent to sent to [facility] before appt fax number [phone]. Please advise.

## 2023-01-10 NOTE — TELEPHONE ENCOUNTER
"Subjective:       Patient ID: Ryan Bernal is a 4 y.o. male.    Vitals:  height is 4' 0.5" (1.232 m) and weight is 26.6 kg (58 lb 12 oz). His temperature is 98.3 °F (36.8 °C). His pulse is 98. His respiration is 16 and oxygen saturation is 98%.     Chief Complaint: COVID-19 Concerns (Entered by patient)    Patient is with sibling and parent on exam. Patient presents to urgent care with cough and congestion. Patient reports positive exposure to COVID-19 at home.    Other  This is a new problem. The current episode started in the past 7 days. The problem occurs intermittently. The problem has been waxing and waning. Associated symptoms include congestion and coughing. Pertinent negatives include no abdominal pain, anorexia, arthralgias, change in bowel habit, chest pain, chills, diaphoresis, fatigue, fever, headaches, joint swelling, myalgias, nausea, neck pain, numbness, rash, sore throat, swollen glands, urinary symptoms, visual change, vomiting or weakness. Nothing aggravates the symptoms. He has tried nothing for the symptoms.       Constitution: Negative for chills, sweating, fatigue and fever.   HENT: Positive for congestion, postnasal drip and sinus pressure. Negative for ear pain, drooling, nosebleeds, foreign body in nose, sinus pain, sore throat, trouble swallowing and voice change.    Neck: Negative for neck pain, neck stiffness, painful lymph nodes and neck swelling.   Cardiovascular: Negative for chest pain, leg swelling, palpitations, sob on exertion and passing out.   Eyes: Negative for eye discharge, eye itching, eye pain, eye redness and eyelid swelling.   Respiratory: Positive for cough. Negative for chest tightness, sputum production, bloody sputum, shortness of breath, stridor and wheezing.    Gastrointestinal: Negative for abdominal pain, abdominal bloating, nausea, vomiting, constipation, diarrhea and heartburn.   Genitourinary: Negative for urine decreased.   Musculoskeletal: Negative for joint " ----- Message from Curtis Sheppard sent at 1/10/2023 12:28 PM CST -----  Contact: pt  Type: Needs Medical Advice  Who Called:  pt     Best Call Back Number: [phone]    Additional Information: pt would like orders for blood test put in and sent to sent to [facility] before appt fax number [phone]. Please advise.           pain, joint swelling, abnormal ROM of joint, pain with walking, muscle cramps and muscle ache.   Skin: Negative for rash and hives.   Allergic/Immunologic: Negative for hives, itching and sneezing.   Neurological: Negative for dizziness, light-headedness, passing out, facial drooping, speech difficulty, loss of balance, headaches, altered mental status, loss of consciousness, numbness and seizures.   Hematologic/Lymphatic: Negative for swollen lymph nodes.   Psychiatric/Behavioral: Negative for altered mental status and nervous/anxious. The patient is not nervous/anxious.        Objective:      Physical Exam   Constitutional: He appears well-developed. He is active and cooperative.  Non-toxic appearance. He does not appear ill. No distress.   HENT:   Head: Normocephalic and atraumatic. No signs of injury. There is normal jaw occlusion.   Ears:   Right Ear: Tympanic membrane, external ear and ear canal normal.   Left Ear: Tympanic membrane, external ear and ear canal normal.   Nose: Mucosal edema, rhinorrhea and congestion present. No signs of injury. No epistaxis in the right nostril. No epistaxis in the left nostril.   Mouth/Throat: Mucous membranes are moist. No posterior oropharyngeal erythema, pharynx swelling or pharynx petechiae. No tonsillar exudate. Oropharynx is clear.   Eyes: Conjunctivae and lids are normal. Visual tracking is normal. Right eye exhibits no discharge and no exudate. Left eye exhibits no discharge and no exudate. No scleral icterus.   Neck: Trachea normal. Neck supple. No neck rigidity present.   Cardiovascular: Normal rate and regular rhythm. Pulses are strong.   Pulmonary/Chest: Effort normal and breath sounds normal. No accessory muscle usage, nasal flaring or stridor. No respiratory distress. He has no decreased breath sounds. He has no wheezes. He has no rhonchi. He has no rales. He exhibits no retraction.   Abdominal: Bowel sounds are normal. He exhibits no distension. Soft. There is  no abdominal tenderness.   Musculoskeletal: Normal range of motion.         General: No tenderness, deformity or signs of injury. Normal range of motion.   Lymphadenopathy:     He has no cervical adenopathy.   Neurological: He is alert.   Skin: Skin is warm, dry, not diaphoretic and no rash. Capillary refill takes less than 2 seconds. No abrasion, No burn and No bruising   Psychiatric: His speech is normal and behavior is normal.   Nursing note and vitals reviewed.    Results for orders placed or performed in visit on 01/23/22   POCT COVID-19 Rapid Screening   Result Value Ref Range    POC Rapid COVID Negative Negative     Acceptable Yes            Assessment:       1. Exposure to COVID-19 virus    2. Sinus congestion          Plan:         Exposure to COVID-19 virus  -     POCT COVID-19 Rapid Screening    Sinus congestion  -     POCT COVID-19 Rapid Screening      Patient Instructions   You must understand that you've received an Urgent Care treatment only and that you may be released before all your medical problems are known or treated. You, the patient, will arrange for follow up care as instructed.  Follow up with your PCP or specialty clinic as directed if not improved or as needed. You can call [phone] to schedule an appointment with the appropriate provider.  If your condition worsens we recommend that you receive another evaluation at the Emergency Department for any concerns or worsening of condition.  Patient/parent aware and verbalized understanding.    Reviewed COVID-19 results with patient/parent.  Counseled patient/parent and answered questions in regards to COVID-19 testing.   Advised patient to go home, treat symptoms and avoid contact with others at this time.  Increase fluids and rest is important.  Humidifier use at home.  OTC Children's Claritin or Zyrtec daily as needed for nasal congestion/postnasal drip/allergies.  OTC Children's Flonase Nasal [place] as directed for nasal  congestion/postnasal drip/allergies.  Advised patient to take OTC Children's VITAMIN C as discussed.  Alternate OTC Children's Tylenol and Motrin unless contraindicated every 4-6 hours as needed for pain, headache, fever, etc.  Info given for virtual visit, covid 19 information line, state info line.   Advised patient/parent to follow-up with Pediatrician and/or Specialist for further evaluation as needed.   Strict ER precautions given to patient.  Follow local/state guidelines per covid emergency.   Patient/parent aware, verbalized understanding and agreed with plan of care.    CDC RECOMMENDATIONS  --IF test results are NEGATIVE and NO known high risk exposure to covid-19 virus, you can be excluded from work/school until:  o MINIMUM OF 24 hours fever-free without the use of fever-reducing medications AND  o Improvement in symptoms (e.g. cough, shortness of breath, fatigue, GI symptoms, etc)     --IF YOU ARE BEING TESTED BECAUSE OF A HIGH RISK EXPOSURE, which the CDC defines as direct contact 6 feet or less for >15 minutes with a known positive person, you should follow CDC guidelines as well as your employer/school protocols for safely returning to work/school.   *Please be aware that there are False Negative possibilities with testing, so you should return to work/school based upon CDC guidelines, not simply a negative result, unless your employer/school has a different RTW protocol/guidance for you.     --IF test results are POSITIVE , you should be excluded from work/school until:  o At least 24 hours FEVER-FREE without the use of fever-reducing medications AND  o Improvement in symptoms (e.g., cough, shortness of breathing, fatigue, GI symptoms, etc) AND  o At least 5 days have passed since symptoms first appeared.    IF NOT IMPROVING, FOLLOW UP WITH VIRTUAL ONLINE VISIT WITH A PROVIDER 24/7 - FOR MORE INFORMATION OR TO DOWNLOAD THE J[facility], VISIT [facility] ANYWHERE CARE AT [facility]/ANYWHERE  FOR 24/7 NURSE  ADVICE, CALL [phone]  FOR COVID 19 RELATED QUESTIONS, CALL the [facility] covid hotline: [phone]  [place] FOR UP TO DATE INFORMATION: Text or dial 211, test keyword [place] TO [id number] OR DIAL 211    HELPFUL EXTERNAL RESOURCES:  OFFICE OF PUBLIC HEALTH: [place] - http://ldh.la.gov/ and 1-659.350.8976  CENTER FOR DISEASE CONTROL - https://www.cdc.gov/   WORLD HEALTH ORGANIZATION (WHO) - https://www.who.int/   CDC WHEN TO QUARANTINE - https://www.cdc.gov/coronavirus/2019-ncov/if-you-are-sick/quarantine.html     INFO ABOUT [facility] COVID-19 RAPID TESTING:  This test utilizes isothermal nucleic acid amplification technology to detect the SARS-CoV-2 RdRp nucleic acid segment.   The analytical sensitivity (limit of detection) is 125 genome equivalents/mL.   A POSITIVE result implies infection with the SARS-CoV-2 virus; the patient is presumed to be contagious.     A NEGATIVE result means that SARS-CoV-2 nucleic acids are not present above the limit of detection.   A NEGATIVE result should be treated as presumptive. It does not rule out the possibility of COVID-19 and should not be the sole basis for treatment decisions.   This test is only for use under the Food and Drug Administration s Emergency Use Authorization (EUA).   Commercial kits are provided by [employer]. Performance characteristics of the EUA have been independently verified by [facility] Department of Pathology and Laboratory Medicine.   _________________________________________________________________   The authorized Fact Sheet for Healthcare Providers and the authorized Fact Sheet for Patients of the ID NOW COVID-19 are available on the FDA website:   https://www.fda.gov/media/451748/download  https://www.fda.gov/media/846270/download

## 2023-01-23 ENCOUNTER — OFFICE VISIT (OUTPATIENT)
Dept: FAMILY MEDICINE | Facility: CLINIC | Age: 48
End: 2023-01-23
Payer: COMMERCIAL

## 2023-01-23 VITALS
TEMPERATURE: 98 F | HEART RATE: 65 BPM | WEIGHT: 284.13 LBS | SYSTOLIC BLOOD PRESSURE: 124 MMHG | DIASTOLIC BLOOD PRESSURE: 78 MMHG | BODY MASS INDEX: 42.08 KG/M2 | RESPIRATION RATE: 18 BRPM | HEIGHT: 69 IN | OXYGEN SATURATION: 97 %

## 2023-01-23 DIAGNOSIS — G47.33 OSA ON CPAP: ICD-10-CM

## 2023-01-23 DIAGNOSIS — Z13.220 SCREENING FOR HYPERLIPIDEMIA: ICD-10-CM

## 2023-01-23 DIAGNOSIS — E78.5 DYSLIPIDEMIA: ICD-10-CM

## 2023-01-23 DIAGNOSIS — Z00.00 ANNUAL PHYSICAL EXAM: Primary | ICD-10-CM

## 2023-01-23 DIAGNOSIS — Z13.1 SCREENING FOR DIABETES MELLITUS: ICD-10-CM

## 2023-01-23 DIAGNOSIS — E29.1 HYPOGONADISM IN MALE: ICD-10-CM

## 2023-01-23 DIAGNOSIS — R53.83 FATIGUE, UNSPECIFIED TYPE: ICD-10-CM

## 2023-01-23 DIAGNOSIS — R73.03 PREDIABETES: ICD-10-CM

## 2023-01-23 DIAGNOSIS — E66.01 MORBID OBESITY: ICD-10-CM

## 2023-01-23 PROCEDURE — 99396 PR PREVENTIVE VISIT,EST,40-64: ICD-10-PCS | Mod: S$GLB,,,

## 2023-01-23 PROCEDURE — 3078F DIAST BP <80 MM HG: CPT | Mod: CPTII,S$GLB,,

## 2023-01-23 PROCEDURE — 3074F PR MOST RECENT SYSTOLIC BLOOD PRESSURE < 130 MM HG: ICD-10-PCS | Mod: CPTII,S$GLB,,

## 2023-01-23 PROCEDURE — 99396 PREV VISIT EST AGE 40-64: CPT | Mod: S$GLB,,,

## 2023-01-23 PROCEDURE — 3008F PR BODY MASS INDEX (BMI) DOCUMENTED: ICD-10-PCS | Mod: CPTII,S$GLB,,

## 2023-01-23 PROCEDURE — 3078F PR MOST RECENT DIASTOLIC BLOOD PRESSURE < 80 MM HG: ICD-10-PCS | Mod: CPTII,S$GLB,,

## 2023-01-23 PROCEDURE — 3074F SYST BP LT 130 MM HG: CPT | Mod: CPTII,S$GLB,,

## 2023-01-23 PROCEDURE — 3008F BODY MASS INDEX DOCD: CPT | Mod: CPTII,S$GLB,,

## 2023-01-23 NOTE — PROGRESS NOTES
Subjective:       Patient ID: Claude A Stevens is a 48 y.o. male.    Chief Complaint: Annual Exam    Patient presents to clinic for check up and to discuss weight loss.  History of Hyperlipidemia. Hypogonadism, prediabetes, and obesity. BMI today is 41.95.  He has had his labs up till now at [facility].  We have no access to his results.  He is not currently taking any medications.  For weight loss he states he just feels like eating all the time. He is also feeling very fatigued.   He has also not been working out like he used to. He has tried adipex and Contrave in the past and did have some positive results at that time. He is also complaining of pain in both feet.  He states he has plantar fascitis. He is wanting to see a Podiatrist.      Review of patient's allergies indicates:  No Known Allergies  Social Determinants of Health     Tobacco Use: Medium Risk    Smoking Tobacco Use: Former    Smokeless Tobacco Use: Never    Passive Exposure: Not on file   Alcohol Use: Not on file   Financial Resource Strain: Not on file   Food Insecurity: Not on file   Transportation Needs: Not on file   Physical Activity: Not on file   Stress: Not on file   Social Connections: Not on file   Housing Stability: Not on file   Depression: Low Risk     Last PHQ Score: 0      Past Medical History:   Diagnosis Date    Hyperglycemia     Hyperlipidemia     Hypogonadism in male     Sleep apnea       Past Surgical History:   Procedure Laterality Date    VASECTOMY        Social History     Socioeconomic History    Marital status:     Number of children: 4       No current outpatient medications on file.    Lab Results   Component Value Date    HGBA1C 5.8 06/09/2021       Review of Systems   Constitutional:  Positive for fatigue.   Respiratory: Negative.     Cardiovascular: Negative.    Musculoskeletal:         Bilateral foot pain     Neurological: Negative.    Psychiatric/Behavioral: Negative.       Objective:      Physical  Exam  Constitutional:       Appearance: Normal appearance.   HENT:      Head: Normocephalic and atraumatic.      Right Ear: Tympanic membrane normal.      Left Ear: Tympanic membrane normal.      Nose: Nose normal.      Mouth/Throat:      Mouth: Mucous membranes are moist.   Eyes:      Pupils: Pupils are equal, round, and reactive to light.   Neck:      Vascular: No carotid bruit.   Cardiovascular:      Rate and Rhythm: Normal rate and regular rhythm.      Pulses: Normal pulses.           Radial pulses are 2+ on the right side and 2+ on the left side.        Dorsalis pedis pulses are 2+ on the right side and 2+ on the left side.      Heart sounds: Normal heart sounds, S1 normal and S2 normal.   Pulmonary:      Effort: Pulmonary effort is normal.      Breath sounds: Normal breath sounds.   Abdominal:      General: Abdomen is flat. Bowel sounds are normal.      Palpations: Abdomen is soft.      Tenderness: There is no abdominal tenderness.   Musculoskeletal:         General: Normal range of motion.      Cervical back: Normal range of motion and neck supple.      Right lower leg: No edema.      Left lower leg: No edema.      Right foot: Normal range of motion.      Left foot: Normal range of motion.   Feet:      Right foot:      Skin integrity: Erythema present.      Left foot:      Skin integrity: Erythema present.      Comments: Feet at not tender to palpation including heels   Lymphadenopathy:      Cervical: No cervical adenopathy.   Skin:     General: Skin is warm and dry.      Capillary Refill: Capillary refill takes less than 2 seconds.   Neurological:      Mental Status: He is alert and oriented to person, place, and time.   Psychiatric:         Mood and Affect: Mood normal.         Behavior: Behavior normal.         Thought Content: Thought content normal.         Judgment: Judgment normal.       Assessment:       1. Annual physical exam    2. Fatigue, unspecified type    3. Screening for diabetes mellitus     4. Screening for hyperlipidemia          Plan:       Claude was seen today for annual exam.    Diagnoses and all orders for this visit:    Annual physical exam  -     CBC Auto Differential; Future  -     Comprehensive Metabolic Panel; Future  -     Hepatitis C Antibody; Future  -     HIV 1/2 Ag/Ab (4th Gen); Future    Fatigue, unspecified type  -     TSH; Future  -     Testosterone; Future    Screening for diabetes mellitus  -     Hemoglobin A1C; Future    Screening for hyperlipidemia  -     Lipid Panel; Future     Fasting labs reordered to have at [facility].    Will refer to Podiatry at patient request.  He will let the office know which doctor he would like to see.    Discussed GLP1s for weight loss but do to cost patient would prefer another medication. Will refer to Dr. Mcneal for weight loss medication.  Patient declines vaccines today  Routine follow up in 6 months.

## 2023-02-06 ENCOUNTER — LAB VISIT (OUTPATIENT)
Dept: LAB | Facility: HOSPITAL | Age: 48
End: 2023-02-06
Payer: COMMERCIAL

## 2023-02-06 DIAGNOSIS — Z13.220 SCREENING FOR HYPERLIPIDEMIA: ICD-10-CM

## 2023-02-06 DIAGNOSIS — Z00.00 ANNUAL PHYSICAL EXAM: ICD-10-CM

## 2023-02-06 DIAGNOSIS — R53.83 FATIGUE, UNSPECIFIED TYPE: ICD-10-CM

## 2023-02-06 DIAGNOSIS — Z13.1 SCREENING FOR DIABETES MELLITUS: ICD-10-CM

## 2023-02-06 LAB
ALBUMIN SERPL BCP-MCNC: 4.1 G/DL (ref 3.5–5.2)
ALP SERPL-CCNC: 70 U/L (ref 55–135)
ALT SERPL W/O P-5'-P-CCNC: 35 U/L (ref 10–44)
ANION GAP SERPL CALC-SCNC: 9 MMOL/L (ref 8–16)
AST SERPL-CCNC: 24 U/L (ref 10–40)
BASOPHILS # BLD AUTO: 0.05 K/UL (ref 0–0.2)
BASOPHILS NFR BLD: 0.8 % (ref 0–1.9)
BILIRUB SERPL-MCNC: 0.9 MG/DL (ref 0.1–1)
BUN SERPL-MCNC: 18 MG/DL (ref 6–20)
CALCIUM SERPL-MCNC: 9 MG/DL (ref 8.7–10.5)
CHLORIDE SERPL-SCNC: 100 MMOL/L (ref 95–110)
CHOLEST SERPL-MCNC: 182 MG/DL (ref 120–199)
CHOLEST/HDLC SERPL: 4.7 {RATIO} (ref 2–5)
CO2 SERPL-SCNC: 31 MMOL/L (ref 23–29)
CREAT SERPL-MCNC: 0.9 MG/DL (ref 0.5–1.4)
DIFFERENTIAL METHOD: NORMAL
EOSINOPHIL # BLD AUTO: 0.1 K/UL (ref 0–0.5)
EOSINOPHIL NFR BLD: 1.4 % (ref 0–8)
ERYTHROCYTE [DISTWIDTH] IN BLOOD BY AUTOMATED COUNT: 12.4 % (ref 11.5–14.5)
EST. GFR  (NO RACE VARIABLE): >60 ML/MIN/1.73 M^2
ESTIMATED AVG GLUCOSE: 111 MG/DL (ref 68–131)
GLUCOSE SERPL-MCNC: 118 MG/DL (ref 70–110)
HBA1C MFR BLD: 5.5 % (ref 4.5–6.2)
HCT VFR BLD AUTO: 43.7 % (ref 40–54)
HDLC SERPL-MCNC: 39 MG/DL (ref 40–75)
HDLC SERPL: 21.4 % (ref 20–50)
HGB BLD-MCNC: 14.6 G/DL (ref 14–18)
IMM GRANULOCYTES # BLD AUTO: 0.01 K/UL (ref 0–0.04)
IMM GRANULOCYTES NFR BLD AUTO: 0.2 % (ref 0–0.5)
LDLC SERPL CALC-MCNC: 113.4 MG/DL (ref 63–159)
LYMPHOCYTES # BLD AUTO: 1.9 K/UL (ref 1–4.8)
LYMPHOCYTES NFR BLD: 31.2 % (ref 18–48)
MCH RBC QN AUTO: 29.1 PG (ref 27–31)
MCHC RBC AUTO-ENTMCNC: 33.4 G/DL (ref 32–36)
MCV RBC AUTO: 87 FL (ref 82–98)
MONOCYTES # BLD AUTO: 0.6 K/UL (ref 0.3–1)
MONOCYTES NFR BLD: 9.8 % (ref 4–15)
NEUTROPHILS # BLD AUTO: 3.5 K/UL (ref 1.8–7.7)
NEUTROPHILS NFR BLD: 56.6 % (ref 38–73)
NONHDLC SERPL-MCNC: 143 MG/DL
NRBC BLD-RTO: 0 /100 WBC
PLATELET # BLD AUTO: 203 K/UL (ref 150–450)
PMV BLD AUTO: 10.2 FL (ref 9.2–12.9)
POTASSIUM SERPL-SCNC: 4.1 MMOL/L (ref 3.5–5.1)
PROT SERPL-MCNC: 7.1 G/DL (ref 6–8.4)
RBC # BLD AUTO: 5.02 M/UL (ref 4.6–6.2)
SODIUM SERPL-SCNC: 140 MMOL/L (ref 136–145)
TRIGL SERPL-MCNC: 148 MG/DL (ref 30–150)
TSH SERPL DL<=0.005 MIU/L-ACNC: 2.89 UIU/ML (ref 0.34–5.6)
WBC # BLD AUTO: 6.21 K/UL (ref 3.9–12.7)

## 2023-02-06 PROCEDURE — 80053 COMPREHEN METABOLIC PANEL: CPT

## 2023-02-06 PROCEDURE — 36415 COLL VENOUS BLD VENIPUNCTURE: CPT

## 2023-02-06 PROCEDURE — 86803 HEPATITIS C AB TEST: CPT

## 2023-02-06 PROCEDURE — 87389 HIV-1 AG W/HIV-1&-2 AB AG IA: CPT

## 2023-02-06 PROCEDURE — 83036 HEMOGLOBIN GLYCOSYLATED A1C: CPT

## 2023-02-06 PROCEDURE — 85025 COMPLETE CBC W/AUTO DIFF WBC: CPT

## 2023-02-06 PROCEDURE — 84403 ASSAY OF TOTAL TESTOSTERONE: CPT

## 2023-02-06 PROCEDURE — 80061 LIPID PANEL: CPT

## 2023-02-06 PROCEDURE — 84443 ASSAY THYROID STIM HORMONE: CPT

## 2023-02-07 LAB
HCV AB S/CO SERPL IA: <0.1 S/CO RATIO (ref 0–0.9)
HIV 1+2 AB+HIV1 P24 AG SERPL QL IA: NON REACTIVE
TESTOST SERPL-MCNC: 368 NG/DL (ref 264–916)

## 2023-02-10 ENCOUNTER — OFFICE VISIT (OUTPATIENT)
Dept: FAMILY MEDICINE | Facility: CLINIC | Age: 48
End: 2023-02-10
Payer: COMMERCIAL

## 2023-02-10 VITALS
HEIGHT: 69 IN | DIASTOLIC BLOOD PRESSURE: 76 MMHG | HEART RATE: 78 BPM | SYSTOLIC BLOOD PRESSURE: 122 MMHG | WEIGHT: 282 LBS | TEMPERATURE: 98 F | OXYGEN SATURATION: 98 % | BODY MASS INDEX: 41.77 KG/M2

## 2023-02-10 DIAGNOSIS — E66.01 MORBID OBESITY: ICD-10-CM

## 2023-02-10 DIAGNOSIS — R73.03 PREDIABETES: Primary | ICD-10-CM

## 2023-02-10 DIAGNOSIS — Z12.11 COLON CANCER SCREENING: ICD-10-CM

## 2023-02-10 PROCEDURE — 3078F DIAST BP <80 MM HG: CPT | Mod: CPTII,S$GLB,, | Performed by: FAMILY MEDICINE

## 2023-02-10 PROCEDURE — 3074F SYST BP LT 130 MM HG: CPT | Mod: CPTII,S$GLB,, | Performed by: FAMILY MEDICINE

## 2023-02-10 PROCEDURE — 1159F PR MEDICATION LIST DOCUMENTED IN MEDICAL RECORD: ICD-10-PCS | Mod: CPTII,S$GLB,, | Performed by: FAMILY MEDICINE

## 2023-02-10 PROCEDURE — 3044F HG A1C LEVEL LT 7.0%: CPT | Mod: CPTII,S$GLB,, | Performed by: FAMILY MEDICINE

## 2023-02-10 PROCEDURE — 99214 OFFICE O/P EST MOD 30 MIN: CPT | Mod: S$GLB,,, | Performed by: FAMILY MEDICINE

## 2023-02-10 PROCEDURE — 3074F PR MOST RECENT SYSTOLIC BLOOD PRESSURE < 130 MM HG: ICD-10-PCS | Mod: CPTII,S$GLB,, | Performed by: FAMILY MEDICINE

## 2023-02-10 PROCEDURE — 3008F PR BODY MASS INDEX (BMI) DOCUMENTED: ICD-10-PCS | Mod: CPTII,S$GLB,, | Performed by: FAMILY MEDICINE

## 2023-02-10 PROCEDURE — 3078F PR MOST RECENT DIASTOLIC BLOOD PRESSURE < 80 MM HG: ICD-10-PCS | Mod: CPTII,S$GLB,, | Performed by: FAMILY MEDICINE

## 2023-02-10 PROCEDURE — 1160F RVW MEDS BY RX/DR IN RCRD: CPT | Mod: CPTII,S$GLB,, | Performed by: FAMILY MEDICINE

## 2023-02-10 PROCEDURE — 3044F PR MOST RECENT HEMOGLOBIN A1C LEVEL <7.0%: ICD-10-PCS | Mod: CPTII,S$GLB,, | Performed by: FAMILY MEDICINE

## 2023-02-10 PROCEDURE — 1160F PR REVIEW ALL MEDS BY PRESCRIBER/CLIN PHARMACIST DOCUMENTED: ICD-10-PCS | Mod: CPTII,S$GLB,, | Performed by: FAMILY MEDICINE

## 2023-02-10 PROCEDURE — 1159F MED LIST DOCD IN RCRD: CPT | Mod: CPTII,S$GLB,, | Performed by: FAMILY MEDICINE

## 2023-02-10 PROCEDURE — 99214 PR OFFICE/OUTPT VISIT, EST, LEVL IV, 30-39 MIN: ICD-10-PCS | Mod: S$GLB,,, | Performed by: FAMILY MEDICINE

## 2023-02-10 PROCEDURE — 3008F BODY MASS INDEX DOCD: CPT | Mod: CPTII,S$GLB,, | Performed by: FAMILY MEDICINE

## 2023-02-10 RX ORDER — PHENTERMINE HYDROCHLORIDE 37.5 MG/1
37.5 TABLET ORAL
Qty: 30 TABLET | Refills: 0 | Status: SHIPPED | OUTPATIENT
Start: 2023-02-10 | End: 2023-03-12

## 2023-02-14 NOTE — PROGRESS NOTES
Subjective:       Patient ID: Claude A Stevens is a 48 y.o. male.    Chief Complaint: Follow-up (Lab res , discuss weight loss options)    Morbid obesity.  BMI 41.6.  Had dropped 40 lb at 1 point and put on another 70.  Prediabetic now glucose 118.  Colon cancer screening is due.  Cardiovascular no chest pain no palpitations.  Thyroid normal 2.89 TSH.  Cholesterol 182 HDL of 39 LDL of 113.  A1c 5.5.  Family history is negative for colon cancer.    Physical examination.  Vital signs noted.  Neck without bruit.  Chest clear.  Heart regular rate rhythm.  Abdomen bowel sounds are positive soft nontender.  Extremities are without edema positive pedal pulses.      Objective:        Assessment:       1. Prediabetes    2. Morbid obesity    3. Colon cancer screening          Plan:       Prediabetes    Morbid obesity    Colon cancer screening  -     Cologuard Screening (Multitarget Stool DNA); Future; Expected date: 02/10/2023    Other orders  -     phentermine (ADIPEX-P) 37.5 mg tablet; Take 1 tablet (37.5 mg total) by mouth before breakfast.  Dispense: 30 tablet; Refill: 0    Cologuard.  Discuss this verses colonoscopy he prefers Cologuard.  Shingles vaccine recommended he wants to wait on this.  Low-fat diet discussed with him in great detail.  Adipex P 30 7.5 mg 30 pills 1 daily follow-up in 1 month on this.

## 2023-04-05 ENCOUNTER — OFFICE VISIT (OUTPATIENT)
Dept: URGENT CARE | Facility: CLINIC | Age: 48
End: 2023-04-05
Payer: COMMERCIAL

## 2023-04-05 VITALS
SYSTOLIC BLOOD PRESSURE: 134 MMHG | TEMPERATURE: 97 F | WEIGHT: 268 LBS | DIASTOLIC BLOOD PRESSURE: 88 MMHG | HEIGHT: 69 IN | OXYGEN SATURATION: 98 % | BODY MASS INDEX: 39.69 KG/M2 | HEART RATE: 75 BPM | RESPIRATION RATE: 18 BRPM

## 2023-04-05 DIAGNOSIS — S91.012A LACERATION OF LEFT ANKLE, INITIAL ENCOUNTER: Primary | ICD-10-CM

## 2023-04-05 PROCEDURE — 12002 LACERATION REPAIR: ICD-10-PCS | Mod: S$GLB,,, | Performed by: NURSE PRACTITIONER

## 2023-04-05 PROCEDURE — 99214 OFFICE O/P EST MOD 30 MIN: CPT | Mod: 25,S$GLB,, | Performed by: NURSE PRACTITIONER

## 2023-04-05 PROCEDURE — 12002 RPR S/N/AX/GEN/TRNK2.6-7.5CM: CPT | Mod: S$GLB,,, | Performed by: NURSE PRACTITIONER

## 2023-04-05 PROCEDURE — 99214 PR OFFICE/OUTPT VISIT, EST, LEVL IV, 30-39 MIN: ICD-10-PCS | Mod: 25,S$GLB,, | Performed by: NURSE PRACTITIONER

## 2023-04-05 RX ORDER — MUPIROCIN 20 MG/G
OINTMENT TOPICAL 3 TIMES DAILY
Qty: 22 G | Refills: 0 | Status: SHIPPED | OUTPATIENT
Start: 2023-04-05 | End: 2023-04-12

## 2023-04-05 RX ORDER — CEPHALEXIN 500 MG/1
500 CAPSULE ORAL EVERY 6 HOURS
Qty: 28 CAPSULE | Refills: 0 | Status: SHIPPED | OUTPATIENT
Start: 2023-04-05 | End: 2023-04-12

## 2023-04-05 NOTE — PROCEDURES
Laceration Repair    Date/Time: 4/5/2023 4:50 PM  Performed by: LIZANDRO Stark Jr.  Authorized by: LIZANDRO Stark Jr.   Body area: lower extremity  Location details: left ankle  Laceration length: 4 cm  Foreign bodies: no foreign bodies  Tendon involvement: none  Nerve involvement: none  Vascular damage: no  Anesthesia: local infiltration    Anesthesia:  Local Anesthetic: lidocaine 1% with epinephrine  Anesthetic total: 6 mL    Patient sedated: no  Preparation: Patient was prepped and draped in the usual sterile fashion.  Irrigation solution: commercial wound cleanser.  Irrigation method: jet lavage  Amount of cleaning: extensive  Debridement: none  Degree of undermining: minimal  Skin closure: 3-0 nylon (4)  Fascia closure: 4-0 Vicryl (2)  Number of sutures: 4  Technique: simple  Approximation: close  Approximation difficulty: simple  Dressing: antibiotic ointment, splint for protection and dressing applied  Patient tolerance: Patient tolerated the procedure well with no immediate complications  Comments: Wound dressed with mupirocin, telfa, and coban. Ankle immobilizer boot ordered to offload suture line until healed.

## 2023-04-05 NOTE — PATIENT INSTRUCTIONS
Keep wound clean and covered for 1st 48 hours  After 48 hours you may Wash wound with clean, soapy water and change dressing   Apply mupirocin ointment with dressing changes  Start Keflex and take entire course as ordered  Follow up with PCP  Monitor the wound and Go directly to the er for any evidence of infection  In 10-14 days have sutures removed  Return to clinic for new concerns.     your immobilization boot at the following address.  Wear this boot until the laceration is healed.  This will prevent flexion of your ankle and the reopening of the laceration    [facility]  [address] [place] [address]  [phone]

## 2023-04-05 NOTE — PROGRESS NOTES
"Subjective:      Patient ID: Claude A Stevens is a 48 y.o. male.    Vitals:  height is 5' 9" (1.753 m) and weight is 121.6 kg (268 lb). His oral temperature is 97.4 °F (36.3 °C). His blood pressure is 134/88 and his pulse is 75. His respiration is 18 and oxygen saturation is 98%.     Chief Complaint: Laceration    Pt states "he cut his left ankle on a saw blade." States he stepped on a saw blade that wasn't moving.  Last Tdap 7/24/2019    Laceration   The incident occurred less than 1 hour ago. Pain location: left ankle.     Constitution: Negative for chills and fever.   Cardiovascular:  Negative for chest pain, palpitations and sob on exertion.   Respiratory:  Negative for shortness of breath.    Skin:  Positive for laceration.   Neurological:  Negative for dizziness, passing out, disorientation, altered mental status, numbness and tingling.   Psychiatric/Behavioral:  Negative for altered mental status, disorientation and confusion.     Objective:     Physical Exam   Constitutional: He is oriented to person, place, and time. He appears well-developed. He is cooperative.  Non-toxic appearance. He does not appear ill. No distress.   HENT:   Head: Normocephalic and atraumatic.   Ears:   Right Ear: External ear normal.   Left Ear: External ear normal.   Nose: Nose normal.   Mouth/Throat: Oropharynx is clear and moist and mucous membranes are normal. Mucous membranes are moist. Oropharynx is clear.   Eyes: Conjunctivae and lids are normal. No scleral icterus.   Neck: Trachea normal and phonation normal. Neck supple.   Cardiovascular: Normal rate, regular rhythm, normal heart sounds and normal pulses.   Pulmonary/Chest: Effort normal and breath sounds normal.   Abdominal: Normal appearance.   Musculoskeletal:         General: No deformity.        Feet:    Neurological: no focal deficit. He is alert and oriented to person, place, and time. He has normal strength and normal reflexes. No sensory deficit.   Skin: Skin is " warm, dry, intact and not diaphoretic. Capillary refill takes 2 to 3 seconds.   Psychiatric: His speech is normal and behavior is normal. Judgment and thought content normal.   Nursing note and vitals reviewed.    Assessment:     1. Laceration of left ankle, initial encounter        Plan:       Laceration of left ankle, initial encounter  -     Cancel: (In Office Administered) Tdap Vaccine  -     AIR CAST WALKER BOOT FOR HOME USE  -     mupirocin (BACTROBAN) 2 % ointment; Apply topically 3 (three) times daily. for 7 days  Dispense: 22 g; Refill: 0  -     cephALEXin (KEFLEX) 500 MG capsule; Take 1 capsule (500 mg total) by mouth every 6 (six) hours. for 7 days  Dispense: 28 capsule; Refill: 0        I have discussed the  physical exam findings with the patient. We discussed symptom monitoring, conservative care methods, medication use, and follow up orders. He verbalized understanding and agreement with the plan of care. Pt. Ambulatory from clinic without difficulty.

## 2023-04-11 ENCOUNTER — PATIENT MESSAGE (OUTPATIENT)
Dept: ADMINISTRATIVE | Facility: HOSPITAL | Age: 48
End: 2023-04-11
Payer: COMMERCIAL

## 2023-04-25 ENCOUNTER — OFFICE VISIT (OUTPATIENT)
Dept: FAMILY MEDICINE | Facility: CLINIC | Age: 48
End: 2023-04-25
Payer: COMMERCIAL

## 2023-04-25 VITALS
DIASTOLIC BLOOD PRESSURE: 70 MMHG | WEIGHT: 272.63 LBS | HEART RATE: 76 BPM | BODY MASS INDEX: 40.38 KG/M2 | TEMPERATURE: 98 F | OXYGEN SATURATION: 95 % | SYSTOLIC BLOOD PRESSURE: 120 MMHG | HEIGHT: 69 IN

## 2023-04-25 DIAGNOSIS — R73.03 PREDIABETES: ICD-10-CM

## 2023-04-25 DIAGNOSIS — G47.33 OSA ON CPAP: ICD-10-CM

## 2023-04-25 DIAGNOSIS — E29.1 HYPOGONADISM IN MALE: Primary | ICD-10-CM

## 2023-04-25 DIAGNOSIS — E66.01 MORBID OBESITY: ICD-10-CM

## 2023-04-25 DIAGNOSIS — Z12.11 SCREENING FOR COLON CANCER: ICD-10-CM

## 2023-04-25 PROCEDURE — 99214 PR OFFICE/OUTPT VISIT, EST, LEVL IV, 30-39 MIN: ICD-10-PCS | Mod: S$GLB,,, | Performed by: FAMILY MEDICINE

## 2023-04-25 PROCEDURE — 3074F PR MOST RECENT SYSTOLIC BLOOD PRESSURE < 130 MM HG: ICD-10-PCS | Mod: CPTII,S$GLB,, | Performed by: FAMILY MEDICINE

## 2023-04-25 PROCEDURE — 3008F PR BODY MASS INDEX (BMI) DOCUMENTED: ICD-10-PCS | Mod: CPTII,S$GLB,, | Performed by: FAMILY MEDICINE

## 2023-04-25 PROCEDURE — 3078F PR MOST RECENT DIASTOLIC BLOOD PRESSURE < 80 MM HG: ICD-10-PCS | Mod: CPTII,S$GLB,, | Performed by: FAMILY MEDICINE

## 2023-04-25 PROCEDURE — 3074F SYST BP LT 130 MM HG: CPT | Mod: CPTII,S$GLB,, | Performed by: FAMILY MEDICINE

## 2023-04-25 PROCEDURE — 3044F PR MOST RECENT HEMOGLOBIN A1C LEVEL <7.0%: ICD-10-PCS | Mod: CPTII,S$GLB,, | Performed by: FAMILY MEDICINE

## 2023-04-25 PROCEDURE — 3078F DIAST BP <80 MM HG: CPT | Mod: CPTII,S$GLB,, | Performed by: FAMILY MEDICINE

## 2023-04-25 PROCEDURE — 99214 OFFICE O/P EST MOD 30 MIN: CPT | Mod: S$GLB,,, | Performed by: FAMILY MEDICINE

## 2023-04-25 PROCEDURE — 3008F BODY MASS INDEX DOCD: CPT | Mod: CPTII,S$GLB,, | Performed by: FAMILY MEDICINE

## 2023-04-25 PROCEDURE — 3044F HG A1C LEVEL LT 7.0%: CPT | Mod: CPTII,S$GLB,, | Performed by: FAMILY MEDICINE

## 2023-04-26 ENCOUNTER — PATIENT MESSAGE (OUTPATIENT)
Dept: FAMILY MEDICINE | Facility: CLINIC | Age: 48
End: 2023-04-26
Payer: COMMERCIAL

## 2023-04-27 NOTE — PROGRESS NOTES
Subjective:       Patient ID: Claude A Stevens is a 48 y.o. male.    Chief Complaint: Medication Refill    BMI is 40.2.  Weight down 11 lb with 1 month of Adipex.  Prediabetic.  Obstructive sleep apnea using CPAP regularly.  Cardiovascular chest pain or palpitations.  Testosterone level 368.  Cholesterol 182 HDL of 39 LDL of 113.  TSH normal 2.89 A1c 5.5.  So no evidence of diabetes.      Physical examination.  Vital signs noted.  Overweight male no acute distress.  Neck without bruit.  Chest clear.  Heart regular rate rhythm.  Extremities without edema.        Objective:        Assessment:       1. Hypogonadism in male    2. Prediabetes    3. ELAN on CPAP    4. Morbid obesity    5. BMI 40.0-44.9, adult    6. Screening for colon cancer        Plan:       Hypogonadism in male    Prediabetes    ELAN on CPAP    Morbid obesity    BMI 40.0-44.9, adult    Screening for colon cancer  -     Cancel: Cologuard Screening (Multitarget Stool DNA); Future; Expected date: 04/25/2023      He is not done his Cologuard test.  Recommend he do this.  Will continue with Adipex P 30 7.5 mg since he tolerated well.  Thirty pills.  Follow-up in 1 month on this.  Discussed alternative weight loss medications for use after the Adipex.  Ozempic will not be covered because he does not have diabetes.  Gave him the name of Wegovy and recommended he check with his insurance to see if it is covered under any circumstances or if it is an exclusion.  Will discuss this at his follow-up.  Another option would be Qsymia.

## 2023-04-28 RX ORDER — PHENTERMINE HYDROCHLORIDE 37.5 MG/1
37.5 TABLET ORAL
Qty: 30 TABLET | Refills: 0 | Status: SHIPPED | OUTPATIENT
Start: 2023-04-28 | End: 2023-05-25 | Stop reason: SDUPTHER

## 2023-05-08 ENCOUNTER — PATIENT MESSAGE (OUTPATIENT)
Dept: FAMILY MEDICINE | Facility: CLINIC | Age: 48
End: 2023-05-08
Payer: COMMERCIAL

## 2023-05-25 ENCOUNTER — OFFICE VISIT (OUTPATIENT)
Dept: FAMILY MEDICINE | Facility: CLINIC | Age: 48
End: 2023-05-25
Payer: COMMERCIAL

## 2023-05-25 VITALS
DIASTOLIC BLOOD PRESSURE: 78 MMHG | WEIGHT: 268.31 LBS | TEMPERATURE: 98 F | OXYGEN SATURATION: 97 % | BODY MASS INDEX: 39.74 KG/M2 | SYSTOLIC BLOOD PRESSURE: 120 MMHG | HEART RATE: 70 BPM | HEIGHT: 69 IN

## 2023-05-25 DIAGNOSIS — E29.1 HYPOGONADISM IN MALE: Primary | ICD-10-CM

## 2023-05-25 DIAGNOSIS — E78.5 HYPERLIPIDEMIA, UNSPECIFIED HYPERLIPIDEMIA TYPE: ICD-10-CM

## 2023-05-25 DIAGNOSIS — G47.33 OSA ON CPAP: ICD-10-CM

## 2023-05-25 DIAGNOSIS — E66.01 MORBID OBESITY: ICD-10-CM

## 2023-05-25 PROCEDURE — 1160F PR REVIEW ALL MEDS BY PRESCRIBER/CLIN PHARMACIST DOCUMENTED: ICD-10-PCS | Mod: CPTII,S$GLB,, | Performed by: FAMILY MEDICINE

## 2023-05-25 PROCEDURE — 99213 OFFICE O/P EST LOW 20 MIN: CPT | Mod: S$GLB,,, | Performed by: FAMILY MEDICINE

## 2023-05-25 PROCEDURE — 1159F MED LIST DOCD IN RCRD: CPT | Mod: CPTII,S$GLB,, | Performed by: FAMILY MEDICINE

## 2023-05-25 PROCEDURE — 3078F DIAST BP <80 MM HG: CPT | Mod: CPTII,S$GLB,, | Performed by: FAMILY MEDICINE

## 2023-05-25 PROCEDURE — 99213 PR OFFICE/OUTPT VISIT, EST, LEVL III, 20-29 MIN: ICD-10-PCS | Mod: S$GLB,,, | Performed by: FAMILY MEDICINE

## 2023-05-25 PROCEDURE — 1159F PR MEDICATION LIST DOCUMENTED IN MEDICAL RECORD: ICD-10-PCS | Mod: CPTII,S$GLB,, | Performed by: FAMILY MEDICINE

## 2023-05-25 PROCEDURE — 3074F SYST BP LT 130 MM HG: CPT | Mod: CPTII,S$GLB,, | Performed by: FAMILY MEDICINE

## 2023-05-25 PROCEDURE — 3008F BODY MASS INDEX DOCD: CPT | Mod: CPTII,S$GLB,, | Performed by: FAMILY MEDICINE

## 2023-05-25 PROCEDURE — 3008F PR BODY MASS INDEX (BMI) DOCUMENTED: ICD-10-PCS | Mod: CPTII,S$GLB,, | Performed by: FAMILY MEDICINE

## 2023-05-25 PROCEDURE — 3074F PR MOST RECENT SYSTOLIC BLOOD PRESSURE < 130 MM HG: ICD-10-PCS | Mod: CPTII,S$GLB,, | Performed by: FAMILY MEDICINE

## 2023-05-25 PROCEDURE — 3078F PR MOST RECENT DIASTOLIC BLOOD PRESSURE < 80 MM HG: ICD-10-PCS | Mod: CPTII,S$GLB,, | Performed by: FAMILY MEDICINE

## 2023-05-25 PROCEDURE — 3044F PR MOST RECENT HEMOGLOBIN A1C LEVEL <7.0%: ICD-10-PCS | Mod: CPTII,S$GLB,, | Performed by: FAMILY MEDICINE

## 2023-05-25 PROCEDURE — 1160F RVW MEDS BY RX/DR IN RCRD: CPT | Mod: CPTII,S$GLB,, | Performed by: FAMILY MEDICINE

## 2023-05-25 PROCEDURE — 3044F HG A1C LEVEL LT 7.0%: CPT | Mod: CPTII,S$GLB,, | Performed by: FAMILY MEDICINE

## 2023-05-25 RX ORDER — PHENTERMINE HYDROCHLORIDE 37.5 MG/1
37.5 TABLET ORAL
Qty: 30 TABLET | Refills: 0 | Status: SHIPPED | OUTPATIENT
Start: 2023-05-25 | End: 2023-06-26 | Stop reason: SDUPTHER

## 2023-06-02 ENCOUNTER — PATIENT MESSAGE (OUTPATIENT)
Dept: FAMILY MEDICINE | Facility: CLINIC | Age: 48
End: 2023-06-02
Payer: COMMERCIAL

## 2023-06-02 DIAGNOSIS — E66.01 MORBID OBESITY: ICD-10-CM

## 2023-06-08 ENCOUNTER — PATIENT MESSAGE (OUTPATIENT)
Dept: FAMILY MEDICINE | Facility: CLINIC | Age: 48
End: 2023-06-08
Payer: COMMERCIAL

## 2023-06-26 ENCOUNTER — OFFICE VISIT (OUTPATIENT)
Dept: FAMILY MEDICINE | Facility: CLINIC | Age: 48
End: 2023-06-26
Payer: COMMERCIAL

## 2023-06-26 VITALS
TEMPERATURE: 97 F | WEIGHT: 270 LBS | OXYGEN SATURATION: 96 % | HEIGHT: 69 IN | BODY MASS INDEX: 39.99 KG/M2 | HEART RATE: 73 BPM | DIASTOLIC BLOOD PRESSURE: 80 MMHG | SYSTOLIC BLOOD PRESSURE: 122 MMHG

## 2023-06-26 DIAGNOSIS — E66.01 MORBID OBESITY: ICD-10-CM

## 2023-06-26 DIAGNOSIS — G47.33 OSA ON CPAP: Primary | ICD-10-CM

## 2023-06-26 DIAGNOSIS — G56.02 CARPAL TUNNEL SYNDROME OF LEFT WRIST: ICD-10-CM

## 2023-06-26 PROCEDURE — 3044F PR MOST RECENT HEMOGLOBIN A1C LEVEL <7.0%: ICD-10-PCS | Mod: CPTII,S$GLB,, | Performed by: FAMILY MEDICINE

## 2023-06-26 PROCEDURE — 99213 PR OFFICE/OUTPT VISIT, EST, LEVL III, 20-29 MIN: ICD-10-PCS | Mod: S$GLB,,, | Performed by: FAMILY MEDICINE

## 2023-06-26 PROCEDURE — 3074F PR MOST RECENT SYSTOLIC BLOOD PRESSURE < 130 MM HG: ICD-10-PCS | Mod: CPTII,S$GLB,, | Performed by: FAMILY MEDICINE

## 2023-06-26 PROCEDURE — 1160F RVW MEDS BY RX/DR IN RCRD: CPT | Mod: CPTII,S$GLB,, | Performed by: FAMILY MEDICINE

## 2023-06-26 PROCEDURE — 3008F BODY MASS INDEX DOCD: CPT | Mod: CPTII,S$GLB,, | Performed by: FAMILY MEDICINE

## 2023-06-26 PROCEDURE — 3044F HG A1C LEVEL LT 7.0%: CPT | Mod: CPTII,S$GLB,, | Performed by: FAMILY MEDICINE

## 2023-06-26 PROCEDURE — 3008F PR BODY MASS INDEX (BMI) DOCUMENTED: ICD-10-PCS | Mod: CPTII,S$GLB,, | Performed by: FAMILY MEDICINE

## 2023-06-26 PROCEDURE — 1159F MED LIST DOCD IN RCRD: CPT | Mod: CPTII,S$GLB,, | Performed by: FAMILY MEDICINE

## 2023-06-26 PROCEDURE — 3079F DIAST BP 80-89 MM HG: CPT | Mod: CPTII,S$GLB,, | Performed by: FAMILY MEDICINE

## 2023-06-26 PROCEDURE — 1159F PR MEDICATION LIST DOCUMENTED IN MEDICAL RECORD: ICD-10-PCS | Mod: CPTII,S$GLB,, | Performed by: FAMILY MEDICINE

## 2023-06-26 PROCEDURE — 99213 OFFICE O/P EST LOW 20 MIN: CPT | Mod: S$GLB,,, | Performed by: FAMILY MEDICINE

## 2023-06-26 PROCEDURE — 3079F PR MOST RECENT DIASTOLIC BLOOD PRESSURE 80-89 MM HG: ICD-10-PCS | Mod: CPTII,S$GLB,, | Performed by: FAMILY MEDICINE

## 2023-06-26 PROCEDURE — 3074F SYST BP LT 130 MM HG: CPT | Mod: CPTII,S$GLB,, | Performed by: FAMILY MEDICINE

## 2023-06-26 PROCEDURE — 1160F PR REVIEW ALL MEDS BY PRESCRIBER/CLIN PHARMACIST DOCUMENTED: ICD-10-PCS | Mod: CPTII,S$GLB,, | Performed by: FAMILY MEDICINE

## 2023-06-26 RX ORDER — PHENTERMINE HYDROCHLORIDE 37.5 MG/1
37.5 TABLET ORAL
Qty: 30 TABLET | Refills: 0 | Status: SHIPPED | OUTPATIENT
Start: 2023-06-26 | End: 2023-07-26

## 2023-06-29 NOTE — PROGRESS NOTES
Subjective:       Patient ID: Claude A Stevens is a 48 y.o. male.    Chief Complaint: Follow-up    Glucose 118 A1c 5.5 so is not been able to get Ozempic.  Left hand gets numb when we would eating.  Last only a short period of time.  BMI is 39.9.  Using the phentermine but only Monday through Friday.    Physical examination.  Vital signs are noted.  Neck without bruit.  Chest clear.  Heart regular rate rhythm.  Positive Tinel's left wrist.      Objective:        Assessment:       1. ELAN on CPAP    2. Carpal tunnel syndrome of left wrist    3. BMI 39.0-39.9,adult    4. Morbid obesity        Plan:       ELAN on CPAP    Carpal tunnel syndrome of left wrist    BMI 39.0-39.9,adult  -     phentermine (ADIPEX-P) 37.5 mg tablet; Take 1 tablet (37.5 mg total) by mouth before breakfast.  Dispense: 30 tablet; Refill: 0    Morbid obesity  -     phentermine (ADIPEX-P) 37.5 mg tablet; Take 1 tablet (37.5 mg total) by mouth before breakfast.  Dispense: 30 tablet; Refill: 0    Refill the phentermine 37.5 mg 30 pills.  Continue his CPAP.  Discussed carpal tunnel syndrome.  Prior authorization sent again for the Wegovy.

## 2023-08-02 ENCOUNTER — PATIENT MESSAGE (OUTPATIENT)
Dept: FAMILY MEDICINE | Facility: CLINIC | Age: 48
End: 2023-08-02
Payer: COMMERCIAL

## 2023-08-03 RX ORDER — PHENTERMINE HYDROCHLORIDE 37.5 MG/1
37.5 TABLET ORAL
Qty: 30 TABLET | Refills: 0 | OUTPATIENT
Start: 2023-08-03 | End: 2023-09-02

## 2023-08-10 RX ORDER — PHENTERMINE HYDROCHLORIDE 37.5 MG/1
37.5 TABLET ORAL
Qty: 30 TABLET | Refills: 0 | OUTPATIENT
Start: 2023-08-10 | End: 2023-09-09

## 2023-08-16 ENCOUNTER — PATIENT MESSAGE (OUTPATIENT)
Dept: FAMILY MEDICINE | Facility: CLINIC | Age: 48
End: 2023-08-16
Payer: COMMERCIAL

## 2024-01-05 ENCOUNTER — PATIENT MESSAGE (OUTPATIENT)
Dept: ADMINISTRATIVE | Facility: HOSPITAL | Age: 49
End: 2024-01-05
Payer: COMMERCIAL

## 2024-03-13 DIAGNOSIS — R73.03 PREDIABETES: ICD-10-CM

## 2024-04-30 ENCOUNTER — OFFICE VISIT (OUTPATIENT)
Dept: PODIATRY | Facility: CLINIC | Age: 49
End: 2024-04-30
Payer: COMMERCIAL

## 2024-04-30 VITALS — HEIGHT: 69 IN | WEIGHT: 267.63 LBS | BODY MASS INDEX: 39.64 KG/M2

## 2024-04-30 DIAGNOSIS — B35.3 TINEA PEDIS OF BOTH FEET: ICD-10-CM

## 2024-04-30 DIAGNOSIS — M20.11 VALGUS DEFORMITY OF BOTH GREAT TOES: ICD-10-CM

## 2024-04-30 DIAGNOSIS — L60.2 OG (ONYCHOGRYPHOSIS): ICD-10-CM

## 2024-04-30 DIAGNOSIS — L85.3 DRY SKIN DERMATITIS: ICD-10-CM

## 2024-04-30 DIAGNOSIS — R73.03 PREDIABETES: Primary | ICD-10-CM

## 2024-04-30 DIAGNOSIS — M20.12 VALGUS DEFORMITY OF BOTH GREAT TOES: ICD-10-CM

## 2024-04-30 PROCEDURE — 99999 PR PBB SHADOW E&M-EST. PATIENT-LVL III: CPT | Mod: PBBFAC,,, | Performed by: PODIATRIST

## 2024-04-30 PROCEDURE — 1160F RVW MEDS BY RX/DR IN RCRD: CPT | Mod: CPTII,S$GLB,, | Performed by: PODIATRIST

## 2024-04-30 PROCEDURE — 1159F MED LIST DOCD IN RCRD: CPT | Mod: CPTII,S$GLB,, | Performed by: PODIATRIST

## 2024-04-30 PROCEDURE — 3008F BODY MASS INDEX DOCD: CPT | Mod: CPTII,S$GLB,, | Performed by: PODIATRIST

## 2024-04-30 PROCEDURE — 99203 OFFICE O/P NEW LOW 30 MIN: CPT | Mod: S$GLB,,, | Performed by: PODIATRIST

## 2024-04-30 RX ORDER — CLOTRIMAZOLE AND BETAMETHASONE DIPROPIONATE 10; .64 MG/G; MG/G
CREAM TOPICAL 2 TIMES DAILY
Qty: 45 G | Refills: 10 | Status: SHIPPED | OUTPATIENT
Start: 2024-04-30 | End: 2024-05-30

## 2024-04-30 RX ORDER — TERBINAFINE HYDROCHLORIDE 250 MG/1
250 TABLET ORAL DAILY
Qty: 30 TABLET | Refills: 0 | Status: SHIPPED | OUTPATIENT
Start: 2024-04-30 | End: 2024-05-30

## 2024-04-30 NOTE — PROGRESS NOTES
"  [address] [facility] [address] [place] [address]  Phone: [phone]   [phone]    Patient's PCP:Que Mcneal III, MD  Referring Provider: Aaareferral Self    Subjective:      Chief Complaint:: Foot Pain (Bilateral heel pain ) and Nail Problem (Left foot big toe )    Foot Pain  Pertinent negatives include no abdominal pain, arthralgias, chest pain, chills, coughing, fatigue, fever, headaches, joint swelling, myalgias, nausea, neck pain, numbness, rash or weakness.   Nail Problem  Pertinent negatives include no abdominal pain, arthralgias, chest pain, chills, coughing, fatigue, fever, headaches, joint swelling, myalgias, nausea, neck pain, numbness, rash or weakness.     Claude A Stevens is a 49 y.o. male who presents today with a complaint of left foot big toe pain and bilateral heel pain from cracks in feet. The current episode started a couple years.  The symptoms include tingling and tenderness. Probable cause of complaint unknown.  The symptoms are aggravated by changes in the weather. The problem has stayed the same. Treatment to date have included grater to the foot, inserts, otc and pain meds, and Aquaphor which provided some relief.         Vitals:    04/30/24 1506   Weight: 121.4 kg (267 lb 10.2 oz)   Height: 5' 9" (1.753 m)   PainSc:   3      Shoe Size: 10.5 4E    Past Surgical History:   Procedure Laterality Date    VASECTOMY       Past Medical History:   Diagnosis Date    Hyperglycemia     Hyperlipidemia     Hypogonadism in male     Sleep apnea      Family History   Problem Relation Name Age of Onset    Heart disease Mother      Stroke Father      Diabetes Father      COPD Maternal Grandmother          Social History:   Marital Status:   Alcohol History:  reports current alcohol use.  Tobacco History:  reports that he quit smoking about 13 years ago. His smoking use included cigarettes. He has never used smokeless tobacco.  Drug History:  reports no history of drug use.    Review " of patient's allergies indicates:  No Known Allergies    Current Outpatient Medications   Medication Sig Dispense Refill    clotrimazole-betamethasone 1-0.05% (LOTRISONE) cream Apply topically 2 (two) times daily. 45 g 10    terbinafine HCL (LAMISIL) 250 mg tablet Take 1 tablet (250 mg total) by mouth once daily. 1 pill by mouth x 90 days. Avoid alcohol intake while taking medication 30 tablet 0     No current facility-administered medications for this visit.       Review of Systems   Constitutional:  Negative for chills, fatigue, fever and unexpected weight change.   HENT:  Negative for hearing loss and trouble swallowing.    Eyes:  Negative for photophobia and visual disturbance.   Respiratory:  Negative for cough, shortness of breath and wheezing.    Cardiovascular:  Negative for chest pain, palpitations and leg swelling.   Gastrointestinal:  Negative for abdominal pain and nausea.   Genitourinary:  Negative for dysuria and frequency.   Musculoskeletal:  Negative for arthralgias, back pain, gait problem, joint swelling, myalgias and neck pain.   Skin:  Negative for rash and wound.   Neurological:  Negative for tremors, seizures, weakness, numbness and headaches.   Hematological:  Does not bruise/bleed easily.         Objective:        Physical Exam:   Foot Exam    General  General Appearance: appears stated age and healthy   Orientation: alert and oriented to person, place, and time   Affect: appropriate   Gait: unimpaired       Right Foot/Ankle     Inspection and Palpation  Ecchymosis: none  Tenderness: great toe metatarsophalangeal joint (Mild pain throughout plantar foot with dry skin and fissures)  Swelling: great toe metatarsophalangeal joint   Arch: pes cavus  Hammertoes: second toe  Hallux valgus: yes  Skin Exam: dry skin and tinea;   Fungus Toenails: not present  Neurovascular  Dorsalis pedis: 2+  Posterior tibial: 2+  Capillary Refill: 2+  Saphenous nerve sensation: normal  Tibial nerve sensation:  normal  Superficial peroneal nerve sensation: normal  Deep peroneal nerve sensation: normal  Sural nerve sensation: normal      Left Foot/Ankle      Inspection and Palpation  Ecchymosis: none  Tenderness: great toe metatarsophalangeal joint (Mild pain throughout plantar foot with dry skin)  Swelling: great toe metatarsophalangeal joint   Arch: pes cavus  Hammertoes: second toe  Hallux valgus: yes  Skin Exam: dry skin and tinea;   Fungus Toenails: not present    Neurovascular  Dorsalis pedis: 2+  Posterior tibial: 2+  Capillary refill: 2+  Saphenous nerve sensation: normal  Tibial nerve sensation: normal  Superficial peroneal nerve sensation: normal  Deep peroneal nerve sensation: normal  Sural nerve sensation: normal        Physical Exam  Cardiovascular:      Pulses:           Dorsalis pedis pulses are 2+ on the right side and 2+ on the left side.        Posterior tibial pulses are 2+ on the right side and 2+ on the left side.   Musculoskeletal:      Right foot: Bunion present.      Left foot: Bunion present.        Feet:    Feet:      Right foot:      Skin integrity: Dry skin present.      Toenail Condition: Fungal disease     Left foot:      Skin integrity: Dry skin present.      Toenail Condition: Fungal disease                        Right Ankle/Foot Exam     Swelling   The patient is swollen on the great toe metatarsophalangeal joint.    Tenderness   The patient is tender to palpation of the great toe metatarsophalangeal joint.    Left Ankle/Foot Exam     Swelling   The patient is swollen on the great toe metatarsophalangeal joint.    Tenderness   The patient is tender to palpation of the great toe metatarsophalangeal joint.      Vascular Exam     Right Pulses  Dorsalis Pedis:      2+  Posterior Tibial:      2+        Left Pulses  Dorsalis Pedis:      2+  Posterior Tibial:      2+           Imaging:            Assessment:       1. Prediabetes    2. Tinea pedis of both feet    3. Dry skin dermatitis    4. Valgus  deformity of both great toes    5. OG (onychogryphosis)      Plan:   Prediabetes    Tinea pedis of both feet    Dry skin dermatitis    Valgus deformity of both great toes    OG (onychogryphosis)    Other orders  -     clotrimazole-betamethasone 1-0.05% (LOTRISONE) cream; Apply topically 2 (two) times daily.  Dispense: 45 g; Refill: 10  -     terbinafine HCL (LAMISIL) 250 mg tablet; Take 1 tablet (250 mg total) by mouth once daily. 1 pill by mouth x 90 days. Avoid alcohol intake while taking medication  Dispense: 30 tablet; Refill: 0     Evaluated the patient today had a discussion with the about the dry skin how this can be tinea pedis or can be eczema psoriasis other skin abnormalities which he has no history of.  I am going to place him on Lamisil 1 pill daily for 30 days and also giving him prescription for Lotrisone to use on his feet.  If his feet are doing better in 6 weeks he will try to go to every other day with the Lotrisone and eventually see if he can keep it stabilized and under control with 2 to 3 times week usually Lotrisone.  I discussed with him his bunion deformity and he is going to stay with conservative care at this point he gets much worse he will consider having bunion surgery.  His toenail is incurvated but there is no signs of infection or pain is left big toes no procedures being done at this time.  Return in 2-3 months if needed    Procedures          Counseling:   Athlete's foot counseling: Counseled patient that it is important to keep the feet dry. Use absorbent cotton socks and change them if they become sweaty. Or wear an open-toe shoe or sandal. Wash the feet at least once a day with soap and water. Apply the antifungal cream as prescribed. Recommend spray antiperspirant to the feet. Some antifungal creams are available without a prescription. It may take a week before the rash starts to improve. It can take about 3 to 4 weeks to completely clear. Continue the medicine until the rash  is all gone. Use over-the-counter antifungal powders or sprays on your feet after exposure to high-risk environments, such as public showers, gyms, and locker rooms. This can help prevent future infections. Wearing appropriate shoes in these situations can help.     I provided patient education regarding: Bunion deformity and causes. I discussed conservative treatment with shoe modification, pads, treating symptoms with OTC NSAIDs, pads between toes, inserts and pads over the bunion. I explained that conservative treatment is non-curative but may help with pain and slow down the progression of the deformity.      We discussed ingrown toenail treatment options of no treatment, avulsion of nail border under local with regrowth of nail, chemical matrixectomy for attempted permanent correction of the problem. Patient was educated about daily dressing changes, soaks, and medications following removal of the nail.    I provided patient education verbally regarding:   Patient diagnosis, treatment options, as well as alternatives, risks, and benefits.     This note was created using Dragon voice recognition software that occasionally misinterpreted phrases or words.

## 2024-06-16 ENCOUNTER — OFFICE VISIT (OUTPATIENT)
Dept: URGENT CARE | Facility: CLINIC | Age: 49
End: 2024-06-16
Payer: COMMERCIAL

## 2024-06-16 VITALS
WEIGHT: 267 LBS | SYSTOLIC BLOOD PRESSURE: 146 MMHG | TEMPERATURE: 99 F | HEIGHT: 69 IN | RESPIRATION RATE: 16 BRPM | HEART RATE: 69 BPM | OXYGEN SATURATION: 97 % | DIASTOLIC BLOOD PRESSURE: 88 MMHG | BODY MASS INDEX: 39.55 KG/M2

## 2024-06-16 DIAGNOSIS — G44.021 INTRACTABLE CHRONIC CLUSTER HEADACHE: Primary | ICD-10-CM

## 2024-06-16 PROCEDURE — 99214 OFFICE O/P EST MOD 30 MIN: CPT | Mod: S$GLB,,, | Performed by: NURSE PRACTITIONER

## 2024-06-16 RX ORDER — FLUTICASONE PROPIONATE 50 MCG
2 SPRAY, SUSPENSION (ML) NASAL DAILY PRN
Qty: 15.8 ML | Refills: 0 | Status: SHIPPED | OUTPATIENT
Start: 2024-06-16 | End: 2024-06-30

## 2024-06-16 RX ORDER — BUTALBITAL, ACETAMINOPHEN AND CAFFEINE 50; 325; 40 MG/1; MG/1; MG/1
1 TABLET ORAL EVERY 4 HOURS PRN
Qty: 20 TABLET | Refills: 0 | Status: SHIPPED | OUTPATIENT
Start: 2024-06-16 | End: 2024-06-16

## 2024-06-16 RX ORDER — BUTALBITAL, ACETAMINOPHEN AND CAFFEINE 50; 325; 40 MG/1; MG/1; MG/1
1 TABLET ORAL EVERY 4 HOURS PRN
Qty: 20 TABLET | Refills: 0 | Status: SHIPPED | OUTPATIENT
Start: 2024-06-16 | End: 2024-06-21

## 2024-06-16 NOTE — PATIENT INSTRUCTIONS
Increase clear fluid intake  Stop all current over the counter cough, cold, flu medicine  Start Fioricet and take it as needed for headache  Use Flonase nasal spray for sinus congestion and pressure.      Add a humidifier to your room at bedtime for respiratory comfort.  Rest until better.  Find a cool dark place to rest until headache resolves  Follow up with PCP  Go immediately to the nearest emergency room for worsening headache, loss of sensation or function, development of dizziness, shortness of breath, chest pain,  or other emergent concern.  Return to clinic for new, worse, or unresolving symptoms

## 2024-06-16 NOTE — PROGRESS NOTES
"Subjective:      Patient ID: Claude A Stevens is a 49 y.o. male.    Vitals:  height is 5' 9" (1.753 m) and weight is 121.1 kg (267 lb). His temperature is 98.5 °F (36.9 °C). His blood pressure is 146/88 (abnormal) and his pulse is 69. His respiration is 16 and oxygen saturation is 97%.     Chief Complaint: Sinus Problem    49-year-old male seen today for headache localized behind right eye and on right side of face.  He states it began 4 days ago along with clear sinus drainage.  He denies any sinus congestion.  There is no other facial pain or sinus congestion.  He has not had a cough or fever.  There is some photophobia        Constitution: Negative for chills and fever.   HENT:  Negative for ear pain, congestion and sore throat.    Cardiovascular:  Negative for chest pain, palpitations and sob on exertion.   Eyes:  Positive for eye discharge and photophobia. Negative for eye trauma, eye pain, blurred vision and eyelid swelling.   Respiratory:  Negative for cough and shortness of breath.    Gastrointestinal:  Negative for nausea and vomiting.   Skin:  Negative for rash.   Neurological:  Positive for headaches. Negative for dizziness, light-headedness, passing out, facial drooping, speech difficulty, coordination disturbances, loss of balance, disorientation, altered mental status, numbness and tingling.   Psychiatric/Behavioral:  Negative for altered mental status, disorientation and confusion.       Objective:     Physical Exam   Constitutional: He is oriented to person, place, and time. He appears well-developed. He is cooperative.  Non-toxic appearance. He does not appear ill. No distress.   HENT:   Head: Normocephalic and atraumatic.       Ears:   Right Ear: Hearing and external ear normal.   Left Ear: Hearing and external ear normal.   Nose: Rhinorrhea (Clear) present. No mucosal edema, nasal deformity or congestion. No epistaxis. Right sinus exhibits no maxillary sinus tenderness and no frontal sinus " tenderness. Left sinus exhibits no maxillary sinus tenderness and no frontal sinus tenderness.   Mouth/Throat: Uvula is midline, oropharynx is clear and moist and mucous membranes are normal. Mucous membranes are moist. No trismus in the jaw. Normal dentition. No uvula swelling. No oropharyngeal exudate, posterior oropharyngeal edema or posterior oropharyngeal erythema.   Eyes: Conjunctivae and lids are normal. Pupils are equal, round, and reactive to light. Right eye exhibits discharge. Right eye exhibits no hordeolum. No foreign body present in the right eye. Left eye exhibits discharge. Left eye exhibits no hordeolum. No foreign body present in the left eye. No scleral icterus. Extraocular movement intact vision grossly intact gaze aligned appropriately      Comments: Watery discharge bilaterally   Neck: Trachea normal and phonation normal. Neck supple. No edema present. No erythema present. No neck rigidity present.   Cardiovascular: Normal rate, regular rhythm, normal heart sounds and normal pulses.   Pulmonary/Chest: Effort normal and breath sounds normal. No stridor. No respiratory distress. He has no decreased breath sounds. He has no rhonchi.   Abdominal: Normal appearance.   Musculoskeletal: Normal range of motion.         General: No deformity. Normal range of motion.   Neurological: no focal deficit. He is alert and oriented to person, place, and time. He has normal sensation, normal strength, normal reflexes and intact cranial nerves (2-12). He displays no weakness, no atrophy and no tremor. No sensory deficit. He exhibits normal muscle tone. Coordination and gait normal. GCS eye subscore is 4. GCS verbal subscore is 5. GCS motor subscore is 6.   Skin: Skin is warm, dry, intact, not diaphoretic and not pale. Capillary refill takes 2 to 3 seconds.   Psychiatric: His speech is normal and behavior is normal. Judgment and thought content normal.   Nursing note and vitals reviewed.      Assessment:     1.  Intractable chronic cluster headache        Plan:       Intractable chronic cluster headache  -     fluticasone propionate (FLONASE) 50 mcg/actuation nasal spray; 2 sprays (100 mcg total) by Each Nostril route daily as needed for Rhinitis.  Dispense: 15.8 mL; Refill: 0  -     butalbital-acetaminophen-caffeine -40 mg (FIORICET, ESGIC) -40 mg per tablet; Take 1 tablet by mouth every 4 (four) hours as needed for Headaches.  Dispense: 20 tablet; Refill: 0      The physical exam findings were discussed with the patient and all questions answered. We discussed symptom monitoring, conservative care methods, medication use, and follow up orders. he verbalized understanding and agreement with the plan of care.

## 2024-06-18 ENCOUNTER — OFFICE VISIT (OUTPATIENT)
Dept: URGENT CARE | Facility: CLINIC | Age: 49
End: 2024-06-18
Payer: COMMERCIAL

## 2024-06-18 VITALS
HEART RATE: 64 BPM | OXYGEN SATURATION: 96 % | HEIGHT: 69 IN | BODY MASS INDEX: 39.99 KG/M2 | WEIGHT: 270 LBS | DIASTOLIC BLOOD PRESSURE: 94 MMHG | TEMPERATURE: 99 F | SYSTOLIC BLOOD PRESSURE: 145 MMHG

## 2024-06-18 DIAGNOSIS — G43.019 INTRACTABLE MIGRAINE WITHOUT AURA AND WITHOUT STATUS MIGRAINOSUS: Primary | ICD-10-CM

## 2024-06-18 DIAGNOSIS — J34.89 SINUS PAIN: ICD-10-CM

## 2024-06-18 DIAGNOSIS — J01.00 ACUTE NON-RECURRENT MAXILLARY SINUSITIS: ICD-10-CM

## 2024-06-18 PROCEDURE — 96372 THER/PROPH/DIAG INJ SC/IM: CPT | Mod: S$GLB,,,

## 2024-06-18 PROCEDURE — 99214 OFFICE O/P EST MOD 30 MIN: CPT | Mod: 25,S$GLB,,

## 2024-06-18 RX ORDER — DEXAMETHASONE SODIUM PHOSPHATE 4 MG/ML
4 INJECTION, SOLUTION INTRA-ARTICULAR; INTRALESIONAL; INTRAMUSCULAR; INTRAVENOUS; SOFT TISSUE
Status: COMPLETED | OUTPATIENT
Start: 2024-06-18 | End: 2024-06-18

## 2024-06-18 RX ORDER — SUMATRIPTAN 50 MG/1
50 TABLET, FILM COATED ORAL 2 TIMES DAILY PRN
Qty: 4 TABLET | Refills: 0 | Status: SHIPPED | OUTPATIENT
Start: 2024-06-18 | End: 2024-06-21 | Stop reason: SDUPTHER

## 2024-06-18 RX ORDER — AMOXICILLIN AND CLAVULANATE POTASSIUM 875; 125 MG/1; MG/1
1 TABLET, FILM COATED ORAL EVERY 12 HOURS
Qty: 10 TABLET | Refills: 0 | Status: SHIPPED | OUTPATIENT
Start: 2024-06-18 | End: 2024-06-23

## 2024-06-18 RX ORDER — KETOROLAC TROMETHAMINE 30 MG/ML
30 INJECTION, SOLUTION INTRAMUSCULAR; INTRAVENOUS
Status: COMPLETED | OUTPATIENT
Start: 2024-06-18 | End: 2024-06-18

## 2024-06-18 RX ADMIN — KETOROLAC TROMETHAMINE 30 MG: 30 INJECTION, SOLUTION INTRAMUSCULAR; INTRAVENOUS at 06:06

## 2024-06-18 RX ADMIN — DEXAMETHASONE SODIUM PHOSPHATE 4 MG: 4 INJECTION, SOLUTION INTRA-ARTICULAR; INTRALESIONAL; INTRAMUSCULAR; INTRAVENOUS; SOFT TISSUE at 05:06

## 2024-06-18 NOTE — PATIENT INSTRUCTIONS
As discussed if symptoms do not to be alleviating after use of Imitrex and starting the antibiotic please go back to the ER for further evaluation    Follow up with primary care

## 2024-06-20 ENCOUNTER — TELEPHONE (OUTPATIENT)
Dept: PULMONOLOGY | Facility: CLINIC | Age: 49
End: 2024-06-20
Payer: COMMERCIAL

## 2024-06-20 NOTE — TELEPHONE ENCOUNTER
----- Message from Jennifer Martinez sent at 6/20/2024  1:27 PM CDT -----  Type: Needs Medical Advice  Who Called:  Patient   Symptoms (please be specific):    How long has patient had these symptoms:    Pharmacy name and phone #:    Best Call Back Number: [phone]  Additional Information: Patient is requesting a call back to schedule an appt..

## 2024-06-21 ENCOUNTER — OFFICE VISIT (OUTPATIENT)
Dept: FAMILY MEDICINE | Facility: CLINIC | Age: 49
End: 2024-06-21
Payer: COMMERCIAL

## 2024-06-21 VITALS
TEMPERATURE: 98 F | DIASTOLIC BLOOD PRESSURE: 84 MMHG | HEIGHT: 69 IN | WEIGHT: 270.5 LBS | BODY MASS INDEX: 40.07 KG/M2 | OXYGEN SATURATION: 97 % | SYSTOLIC BLOOD PRESSURE: 128 MMHG | HEART RATE: 64 BPM

## 2024-06-21 DIAGNOSIS — G43.019 INTRACTABLE MIGRAINE WITHOUT AURA AND WITHOUT STATUS MIGRAINOSUS: ICD-10-CM

## 2024-06-21 DIAGNOSIS — B02.8 HERPES ZOSTER WITH COMPLICATION: Primary | ICD-10-CM

## 2024-06-21 PROCEDURE — 3008F BODY MASS INDEX DOCD: CPT | Mod: CPTII,S$GLB,,

## 2024-06-21 PROCEDURE — 3079F DIAST BP 80-89 MM HG: CPT | Mod: CPTII,S$GLB,,

## 2024-06-21 PROCEDURE — 3074F SYST BP LT 130 MM HG: CPT | Mod: CPTII,S$GLB,,

## 2024-06-21 PROCEDURE — 99214 OFFICE O/P EST MOD 30 MIN: CPT | Mod: S$GLB,,,

## 2024-06-21 PROCEDURE — 99999 PR PBB SHADOW E&M-EST. PATIENT-LVL III: CPT | Mod: PBBFAC,,,

## 2024-06-21 RX ORDER — VALACYCLOVIR HYDROCHLORIDE 1 G/1
1000 TABLET, FILM COATED ORAL 3 TIMES DAILY
Qty: 21 TABLET | Refills: 0 | Status: SHIPPED | OUTPATIENT
Start: 2024-06-21 | End: 2024-06-28

## 2024-06-21 RX ORDER — GABAPENTIN 300 MG/1
300 CAPSULE ORAL 3 TIMES DAILY
Qty: 42 CAPSULE | Refills: 0 | Status: SHIPPED | OUTPATIENT
Start: 2024-06-21 | End: 2025-06-21

## 2024-06-21 RX ORDER — SUMATRIPTAN 50 MG/1
50 TABLET, FILM COATED ORAL 2 TIMES DAILY PRN
Qty: 9 TABLET | Refills: 1 | Status: SHIPPED | OUTPATIENT
Start: 2024-06-21

## 2024-06-21 NOTE — PROGRESS NOTES
Subjective:       Patient ID: Claude A Stevens is a 49 y.o. male.    Chief Complaint: No chief complaint on file.    Claude A Stevens is a 49 y.o. male presents to clinic today with complaints of facial and head pain.  Patient states he has had pain on the right side of his head and right eye for about a week.  Pain started to get worse on Saturday and Sunday.  He describes the pain as a skin pain and a solid headache on the right side.  In the ER CBC and chemistry panel with no significant abnormalities, normal sed rate.  CT scan of the brain with no acute intracranial abnormalities.  COVID and influenza negative.  He was given medication and his pain improved so he was discharged.  When he went to urgent care he was given Flonase and Fioricet.  Fioricet is helping.  Additionally his right eye is draining.  He does have some sensitivity to light.        Review of patient's allergies indicates:  No Known Allergies  Social Determinants of Health     Tobacco Use: Medium Risk (6/25/2024)    Patient History     Smoking Tobacco Use: Former     Smokeless Tobacco Use: Never     Passive Exposure: Not on file   Alcohol Use: Alcohol Misuse (6/25/2024)    AUDIT-C     Frequency of Alcohol Consumption: 2-4 times a month     Average Number of Drinks: 3 or 4     Frequency of Binge Drinking: Monthly   Financial Resource Strain: Low Risk  (6/25/2024)    Overall Financial Resource Strain (CARDIA)     Difficulty of Paying Living Expenses: Not very hard   Food Insecurity: Unknown (6/25/2024)    Hunger Vital Sign     Worried About Running Out of Food in the Last Year: Never true     Ran Out of Food in the Last Year: Not on file   Transportation Needs: Not on file   Physical Activity: Insufficiently Active (6/25/2024)    Exercise Vital Sign     Days of Exercise per Week: 6 days     Minutes of Exercise per Session: 10 min   Stress: No Stress Concern Present (6/25/2024)    [employer] - Occupational Stress  Questionnaire     Feeling of Stress : Not at all   Housing Stability: Not on file   Depression: Low Risk  (6/21/2024)    Depression     Last PHQ-4: Flowsheet Data: 0   Utilities: Not At Risk (6/25/2024)    [facility] Utilities     Threatened with loss of utilities: No   Health Literacy: Adequate Health Literacy (6/25/2024)     Health Literacy     Frequency of need for help with medical instructions: Never   Social Isolation: Not on file      Past Medical History:   Diagnosis Date    Hyperglycemia     Hyperlipidemia     Hypogonadism in male     Sleep apnea       Past Surgical History:   Procedure Laterality Date    VASECTOMY        Social History     Socioeconomic History    Marital status:     Number of children: 4         Current Outpatient Medications:     fluticasone propionate (FLONASE) 50 mcg/actuation nasal spray, 2 sprays (100 mcg total) by Each Nostril route daily as needed for Rhinitis., Disp: 15.8 mL, Rfl: 0    gabapentin (NEURONTIN) 300 MG capsule, Take 1 capsule (300 mg total) by mouth 3 (three) times daily., Disp: 42 capsule, Rfl: 0    sumatriptan (IMITREX) 50 MG tablet, Take 1 tablet (50 mg total) by mouth 2 (two) times daily as needed for Migraine., Disp: 9 tablet, Rfl: 1    valACYclovir (VALTREX) 1000 MG tablet, Take 1 tablet (1,000 mg total) by mouth 3 (three) times daily. for 7 days (Patient not taking: Reported on 6/25/2024), Disp: 21 tablet, Rfl: 0    Lab Results   Component Value Date    WBC 6.21 02/06/2023    HGB 14.6 02/06/2023    HCT 43.7 02/06/2023     02/06/2023    CHOL 182 02/06/2023    TRIG 148 02/06/2023    HDL 39 (L) 02/06/2023    ALT 35 02/06/2023    AST 24 02/06/2023     02/06/2023    K 4.1 02/06/2023     02/06/2023    CREATININE 0.9 02/06/2023    BUN 18 02/06/2023    CO2 31 (H) 02/06/2023    TSH 2.890 02/06/2023    HGBA1C 5.5 02/06/2023       Review of Systems   Constitutional: Negative.    HENT:  Positive for rhinorrhea. Negative for ear pain, facial swelling  and sore throat.    Eyes:  Positive for discharge.   Respiratory: Negative.     Cardiovascular: Negative.    Gastrointestinal: Negative.    Neurological:  Positive for headaches. Negative for facial asymmetry, speech difficulty, weakness and numbness.       Objective:      Physical Exam  Vitals reviewed.   Constitutional:       Appearance: Normal appearance.   HENT:      Right Ear: Tympanic membrane normal.      Left Ear: Tympanic membrane normal.      Nose: Rhinorrhea present. Rhinorrhea is clear.      Right Sinus: No maxillary sinus tenderness or frontal sinus tenderness.      Left Sinus: No maxillary sinus tenderness or frontal sinus tenderness.   Eyes:      Extraocular Movements: Extraocular movements intact.      Conjunctiva/sclera: Conjunctivae normal.      Pupils: Pupils are equal, round, and reactive to light.      Comments: Clear drainage from right eye   Cardiovascular:      Rate and Rhythm: Normal rate and regular rhythm.      Pulses: Normal pulses.      Heart sounds: Normal heart sounds.   Pulmonary:      Effort: Pulmonary effort is normal.      Breath sounds: Normal breath sounds.   Neurological:      Mental Status: He is alert.         Assessment:       1. Herpes zoster with complication    2. Intractable migraine without aura and without status migrainosus        Plan:       Diagnoses and all orders for this visit:    Herpes zoster with complication  -     gabapentin (NEURONTIN) 300 MG capsule; Take 1 capsule (300 mg total) by mouth 3 (three) times daily.  -     valACYclovir (VALTREX) 1000 MG tablet; Take 1 tablet (1,000 mg total) by mouth 3 (three) times daily. for 7 days (Patient not taking: Reported on 6/25/2024)    Intractable migraine without aura and without status migrainosus  -     sumatriptan (IMITREX) 50 MG tablet; Take 1 tablet (50 mg total) by mouth 2 (two) times daily as needed for Migraine.    Possible shingles.  We will try Valtrex and gabapentin.  Imitrex for headache.  Follow-up if  symptoms worsen or do not improve.

## 2024-06-25 ENCOUNTER — OFFICE VISIT (OUTPATIENT)
Dept: PULMONOLOGY | Facility: CLINIC | Age: 49
End: 2024-06-25
Payer: COMMERCIAL

## 2024-06-25 VITALS
HEART RATE: 80 BPM | OXYGEN SATURATION: 98 % | WEIGHT: 274 LBS | BODY MASS INDEX: 40.58 KG/M2 | HEIGHT: 69 IN | SYSTOLIC BLOOD PRESSURE: 140 MMHG | DIASTOLIC BLOOD PRESSURE: 90 MMHG

## 2024-06-25 DIAGNOSIS — G47.33 OSA (OBSTRUCTIVE SLEEP APNEA): Primary | ICD-10-CM

## 2024-06-25 PROCEDURE — 99203 OFFICE O/P NEW LOW 30 MIN: CPT | Mod: S$GLB,,, | Performed by: NURSE PRACTITIONER

## 2024-06-25 PROCEDURE — 3008F BODY MASS INDEX DOCD: CPT | Mod: CPTII,S$GLB,, | Performed by: NURSE PRACTITIONER

## 2024-06-25 PROCEDURE — 3080F DIAST BP >= 90 MM HG: CPT | Mod: CPTII,S$GLB,, | Performed by: NURSE PRACTITIONER

## 2024-06-25 PROCEDURE — 99999 PR PBB SHADOW E&M-EST. PATIENT-LVL III: CPT | Mod: PBBFAC,,, | Performed by: NURSE PRACTITIONER

## 2024-06-25 PROCEDURE — 3077F SYST BP >= 140 MM HG: CPT | Mod: CPTII,S$GLB,, | Performed by: NURSE PRACTITIONER

## 2024-06-25 PROCEDURE — 1159F MED LIST DOCD IN RCRD: CPT | Mod: CPTII,S$GLB,, | Performed by: NURSE PRACTITIONER

## 2024-06-25 NOTE — PROGRESS NOTES
SUBJECTIVE:    Patient ID: Claude A Stevens is a 49 y.o. male.    Chief Complaint: Establish Care and Apnea    HPI  Patient here today to establish care for ELAN.  He states he was diagnosed 12 years ago and has had the same machine since. He faithfully sleeps on his CPAP every night and feels benefit.  Recently his humidifier has broke on the machine and it has been affecting his sleep. He is waking up again during the night. His compliance report shows that he is 100% compliant sleeps an average of 6 hours and 23 minutes, report does not show AHI.  He uses [facility].    Past Medical History:   Diagnosis Date    Hyperglycemia     Hyperlipidemia     Hypogonadism in male     Sleep apnea      Past Surgical History:   Procedure Laterality Date    VASECTOMY       Family History   Problem Relation Name Age of Onset    Heart disease Mother      Stroke Father      Diabetes Father      COPD Maternal Grandmother          Social History:   Marital Status:   Occupation: Data Unavailable  Alcohol History:  reports current alcohol use.  Tobacco History:  reports that he quit smoking about 13 years ago. His smoking use included cigarettes. He has never used smokeless tobacco.  Drug History:  reports no history of drug use.    Review of patient's allergies indicates:  No Known Allergies    Current Outpatient Medications   Medication Sig Dispense Refill    fluticasone propionate (FLONASE) 50 mcg/actuation nasal spray 2 sprays (100 mcg total) by Each Nostril route daily as needed for Rhinitis. 15.8 mL 0    gabapentin (NEURONTIN) 300 MG capsule Take 1 capsule (300 mg total) by mouth 3 (three) times daily. 42 capsule 0    sumatriptan (IMITREX) 50 MG tablet Take 1 tablet (50 mg total) by mouth 2 (two) times daily as needed for Migraine. 9 tablet 1    valACYclovir (VALTREX) 1000 MG tablet Take 1 tablet (1,000 mg total) by mouth 3 (three) times daily. for 7 days (Patient not taking: Reported on 6/25/2024) 21 tablet 0     No current  "facility-administered medications for this visit.           Review of Systems  General: Feeling Well. Waking up during the night again, fatigued in the morning since machine humidifier broke  Eyes: Vision is good.  ENT:  No sinusitis or pharyngitis.   Heart:: No chest pain or palpitations.  Lungs: No cough, sputum, or wheezing.  GI: No Nausea, vomiting, constipation, diarrhea, or reflux.  : No dysuria, hesitancy, or nocturia.  Musculoskeletal: No joint pain or myalgias.  Skin: No lesions or rashes.  Neuro: headaches and brain fog .  Lymph: No edema or adenopathy.  Psych: No anxiety or depression.  Endo: weight gain     OBJECTIVE:      BP (!) 140/100 (BP Location: Left arm, Patient Position: Sitting, BP Method: Medium (Manual))   Pulse 80   Ht 5' 9" (1.753 m)   Wt 124.3 kg (274 lb)   SpO2 98%   BMI 40.46 kg/m²     Physical Exam  GENERAL: Older patient in no distress.  HEENT: Pupils equal and reactive. Extraocular movements intact. Nose intact.      Pharynx moist.  NECK: Supple.   HEART: Regular rate and rhythm. No murmur or gallop auscultated.  LUNGS: Clear to auscultation and percussion. Lung excursion symmetrical. No change in fremitus. No adventitial noises.  ABDOMEN: Bowel sounds present. Non-tender, no masses palpated.  EXTREMITIES: Normal muscle tone and joint movement, no cyanosis or clubbing.   LYMPHATICS: No adenopathy palpated, no edema.  SKIN: Dry, intact, no lesions.   NEURO: Cranial nerves II-XII intact. Motor strength 5/5 bilaterally, upper and lower extremities.  PSYCH: Appropriate affect.    Assessment:       1. ELAN (obstructive sleep apnea)        [place] is 9  Plan:       ELAN (obstructive sleep apnea)  -     CPAP FOR HOME USE       Needs new CPAP machine 14cm with nasal pillows   Check with tej to see if have old studies   Follow up in about 3 months (around 9/25/2024).          "

## 2024-09-25 ENCOUNTER — OFFICE VISIT (OUTPATIENT)
Dept: PULMONOLOGY | Facility: CLINIC | Age: 49
End: 2024-09-25
Payer: COMMERCIAL

## 2024-09-25 VITALS
OXYGEN SATURATION: 98 % | HEART RATE: 66 BPM | DIASTOLIC BLOOD PRESSURE: 80 MMHG | WEIGHT: 279 LBS | BODY MASS INDEX: 41.32 KG/M2 | SYSTOLIC BLOOD PRESSURE: 140 MMHG | HEIGHT: 69 IN

## 2024-09-25 DIAGNOSIS — G47.33 OSA (OBSTRUCTIVE SLEEP APNEA): Primary | ICD-10-CM

## 2024-09-25 PROCEDURE — 1159F MED LIST DOCD IN RCRD: CPT | Mod: CPTII,S$GLB,, | Performed by: NURSE PRACTITIONER

## 2024-09-25 PROCEDURE — 99213 OFFICE O/P EST LOW 20 MIN: CPT | Mod: S$GLB,,, | Performed by: NURSE PRACTITIONER

## 2024-09-25 PROCEDURE — 3077F SYST BP >= 140 MM HG: CPT | Mod: CPTII,S$GLB,, | Performed by: NURSE PRACTITIONER

## 2024-09-25 PROCEDURE — 99999 PR PBB SHADOW E&M-EST. PATIENT-LVL III: CPT | Mod: PBBFAC,,, | Performed by: NURSE PRACTITIONER

## 2024-09-25 PROCEDURE — 3079F DIAST BP 80-89 MM HG: CPT | Mod: CPTII,S$GLB,, | Performed by: NURSE PRACTITIONER

## 2024-09-25 PROCEDURE — 3008F BODY MASS INDEX DOCD: CPT | Mod: CPTII,S$GLB,, | Performed by: NURSE PRACTITIONER

## 2024-09-25 NOTE — PROGRESS NOTES
SUBJECTIVE:    Patient ID: Claude A Stevens is a 49 y.o. male.    Chief Complaint: Apnea    Apnea      Patient here today to establish care for ELAN.  He does not sleep without his CPAP.  He took machine to [place] and forgot to connect his CPAP to his Wifi again but he did not sleep without it.  He feels great benefit from it.  His compliance download shows that he is 100% compliant sleeps an average of 6 hours and 22 minutes with an AHI of 0.42.   Past Medical History:   Diagnosis Date    Hyperglycemia     Hyperlipidemia     Hypogonadism in male     Sleep apnea      Past Surgical History:   Procedure Laterality Date    VASECTOMY       Family History   Problem Relation Name Age of Onset    Heart disease Mother      Stroke Father      Diabetes Father      COPD Maternal Grandmother          Social History:   Marital Status:   Occupation: Data Unavailable  Alcohol History:  reports current alcohol use.  Tobacco History:  reports that he quit smoking about 13 years ago. His smoking use included cigarettes. He has never used smokeless tobacco.  Drug History:  reports no history of drug use.    Review of patient's allergies indicates:  No Known Allergies    Current Outpatient Medications   Medication Sig Dispense Refill    gabapentin (NEURONTIN) 300 MG capsule Take 1 capsule (300 mg total) by mouth 3 (three) times daily. 42 capsule 0    sumatriptan (IMITREX) 50 MG tablet Take 1 tablet (50 mg total) by mouth 2 (two) times daily as needed for Migraine. 9 tablet 1    valACYclovir (VALTREX) 1000 MG tablet Take 1 tablet (1,000 mg total) by mouth 3 (three) times daily. for 7 days (Patient not taking: Reported on 6/25/2024) 21 tablet 0     No current facility-administered medications for this visit.           Review of Systems  General: Feeling Well. Not waking up during the night   Eyes: Vision is good.  ENT:  No sinusitis or pharyngitis.   Heart:: No chest pain or palpitations.  Lungs: No cough, sputum, or  "wheezing.  GI: No Nausea, vomiting, constipation, diarrhea, or reflux.  : No dysuria, hesitancy, or nocturia.  Musculoskeletal: No joint pain or myalgias.  Skin: No lesions or rashes.  Neuro: no more headaches and brain fog .  Lymph: No edema or adenopathy.  Psych: No anxiety or depression.  Endo: weight gain     OBJECTIVE:      BP (!) 140/80 (BP Location: Right arm, Patient Position: Sitting, BP Method: Large (Manual))   Pulse 66   Ht 5' 9" (1.753 m)   Wt 126.6 kg (279 lb)   SpO2 98%   BMI 41.20 kg/m²     Physical Exam  GENERAL: Older patient in no distress.  HEENT: Pupils equal and reactive. Extraocular movements intact. Nose intact.      Pharynx moist.  NECK: Supple.   HEART: Regular rate and rhythm. No murmur or gallop auscultated.  LUNGS: Clear to auscultation and percussion. Lung excursion symmetrical. No change in fremitus. No adventitial noises.  ABDOMEN: Bowel sounds present. Non-tender, no masses palpated.  EXTREMITIES: Normal muscle tone and joint movement, no cyanosis or clubbing.   LYMPHATICS: No adenopathy palpated, no edema.  SKIN: Dry, intact, no lesions.   NEURO: Cranial nerves II-XII intact. Motor strength 5/5 bilaterally, upper and lower extremities.  PSYCH: Appropriate affect.    Assessment:       1. ELAN (obstructive sleep apnea)            Plan:       ELAN (obstructive sleep apnea)         Continue to sleep on your CPAP  Follow up in about 1 year (around 9/25/2025).            "

## 2024-10-02 ENCOUNTER — TELEPHONE (OUTPATIENT)
Dept: FAMILY MEDICINE | Facility: CLINIC | Age: 49
End: 2024-10-02
Payer: COMMERCIAL

## 2024-10-02 DIAGNOSIS — E78.5 HYPERLIPIDEMIA, UNSPECIFIED HYPERLIPIDEMIA TYPE: ICD-10-CM

## 2024-10-02 DIAGNOSIS — E29.1 HYPOGONADISM IN MALE: Primary | ICD-10-CM

## 2024-10-02 DIAGNOSIS — R73.03 PREDIABETES: ICD-10-CM

## 2024-10-02 DIAGNOSIS — Z00.00 PHYSICAL EXAM: ICD-10-CM

## 2024-10-02 NOTE — TELEPHONE ENCOUNTER
10/15/2024  4:30 PM EP - PRIMARY CARE ([facility]) Dr. Mcneal - Family Medicine Que Mcneal III, MD   Patient has an appointment

## 2024-10-02 NOTE — TELEPHONE ENCOUNTER
Done and called and confirmed fax with [facility] lab     ----- Message from Hetal sent at 10/1/2024 10:26 AM CDT -----  Contact: pt [phone]  Type: Needs Medical Advice  Who Called:  Pt     Best Call Back Number: [phone]    Additional Information: Pt requesting annual labs be sent to [facility] in [place]

## 2024-10-08 ENCOUNTER — TELEPHONE (OUTPATIENT)
Dept: FAMILY MEDICINE | Facility: CLINIC | Age: 49
End: 2024-10-08
Payer: COMMERCIAL

## 2024-10-08 NOTE — TELEPHONE ENCOUNTER
Called pt advised re faxing    ----- Message from Lauryn sent at 10/8/2024  8:00 AM CDT -----  Regarding: Needs orders faxed asap  Type: Needs Medical Advice  Who Called:       Best Call Back Number:  [phone] FAX: [phone]  Additional Information: Pt is there right now trying to get labs done, they dont have the orders asking toño have them faxed to them asap.

## 2024-10-15 ENCOUNTER — OFFICE VISIT (OUTPATIENT)
Dept: FAMILY MEDICINE | Facility: CLINIC | Age: 49
End: 2024-10-15
Payer: COMMERCIAL

## 2024-10-15 VITALS
OXYGEN SATURATION: 97 % | BODY MASS INDEX: 40.88 KG/M2 | TEMPERATURE: 98 F | HEIGHT: 69 IN | HEART RATE: 71 BPM | WEIGHT: 276 LBS

## 2024-10-15 DIAGNOSIS — Z98.52 S/P VASECTOMY: ICD-10-CM

## 2024-10-15 DIAGNOSIS — E66.01 MORBID OBESITY: ICD-10-CM

## 2024-10-15 DIAGNOSIS — Z12.11 COLON CANCER SCREENING: ICD-10-CM

## 2024-10-15 DIAGNOSIS — Z00.00 ENCOUNTER FOR PREVENTIVE CARE: ICD-10-CM

## 2024-10-15 DIAGNOSIS — G47.33 OSA ON CPAP: Primary | ICD-10-CM

## 2024-10-15 DIAGNOSIS — R53.83 FATIGUE, UNSPECIFIED TYPE: ICD-10-CM

## 2024-10-15 PROCEDURE — 99999 PR PBB SHADOW E&M-EST. PATIENT-LVL III: CPT | Mod: PBBFAC,,, | Performed by: FAMILY MEDICINE

## 2024-10-17 PROBLEM — Z98.52 S/P VASECTOMY: Status: ACTIVE | Noted: 2024-10-17

## 2024-10-18 NOTE — PROGRESS NOTES
Subjective:       Patient ID: Claude A Stevens is a 49 y.o. male.    Chief Complaint: Annual Exam    Here for physical.      Former smoker quit for 12 years now.  Alcohol intake occasional.  Works doing kitchen instillation.      Family history coronary artery disease.  Diabetes mellitus type 2.  Cousin with leukemia.    No medications.    No known drug allergies.      Past medical history.  Obstructive sleep apnea on CPAP compliant and benefitting.  BMI 40.8.  Morbid obesity.  Prior vasectomy.  Cholesterol 195 HDL of 44 LDL of 110 CMP is normal.      Review of Systems   Constitutional: Negative.    HENT: Negative.     Eyes: Negative.    Respiratory: Negative.     Cardiovascular: Negative.    Gastrointestinal: Negative.    Endocrine: Negative.    Genitourinary: Negative.    Musculoskeletal: Negative.    Skin: Negative.    Neurological: Negative.    Hematological: Negative.    Psychiatric/Behavioral: Negative.         Objective:      Physical Exam  Vitals reviewed.   Constitutional:       Appearance: Normal appearance. He is well-developed. He is obese.   HENT:      Head: Normocephalic and atraumatic.      Right Ear: Tympanic membrane normal.      Left Ear: Tympanic membrane normal.      Nose: Nose normal.      Mouth/Throat:      Mouth: Mucous membranes are moist.      Pharynx: No oropharyngeal exudate.   Eyes:      Conjunctiva/sclera: Conjunctivae normal.      Pupils: Pupils are equal, round, and reactive to light.   Neck:      Vascular: No carotid bruit.   Cardiovascular:      Rate and Rhythm: Normal rate and regular rhythm.      Pulses: Normal pulses.      Heart sounds: Normal heart sounds. No murmur heard.     No gallop.   Pulmonary:      Effort: Pulmonary effort is normal.      Breath sounds: Normal breath sounds.   Abdominal:      General: Bowel sounds are normal.      Palpations: Abdomen is soft. There is no mass.      Tenderness: There is no abdominal tenderness.   Musculoskeletal:         General: Normal  range of motion.      Cervical back: Normal range of motion.   Skin:     General: Skin is warm and dry.   Neurological:      General: No focal deficit present.      Mental Status: He is alert and oriented to person, place, and time.   Psychiatric:         Mood and Affect: Mood normal.         Behavior: Behavior normal.         Assessment:       1. ELAN on CPAP    2. BMI 40.0-44.9, adult    3. Colon cancer screening    4. Morbid obesity    5. S/P vasectomy    6. Fatigue, unspecified type    7. Encounter for preventive care        Plan:       ELAN on CPAP    BMI 40.0-44.9, adult    Colon cancer screening  -     Cologuard Screening (Multitarget Stool DNA); Future; Expected date: 10/15/2024    Morbid obesity    S/P vasectomy  -     PSA, SCREENING; Future; Expected date: 10/15/2024    Fatigue, unspecified type  -     TSH; Future; Expected date: 10/15/2024    Encounter for preventive care    Flu shot declined.  Discussed colon cancer screening his opted to do Cologuard.  Ordered.  PSA TSH ordered.  Discussed diet in detail.  Low-fat.

## 2024-11-13 ENCOUNTER — PATIENT MESSAGE (OUTPATIENT)
Dept: RESEARCH | Facility: HOSPITAL | Age: 49
End: 2024-11-13
Payer: COMMERCIAL